# Patient Record
Sex: MALE | Race: AMERICAN INDIAN OR ALASKA NATIVE | NOT HISPANIC OR LATINO
[De-identification: names, ages, dates, MRNs, and addresses within clinical notes are randomized per-mention and may not be internally consistent; named-entity substitution may affect disease eponyms.]

---

## 2017-01-06 ENCOUNTER — APPOINTMENT (OUTPATIENT)
Dept: INTERNAL MEDICINE | Facility: CLINIC | Age: 47
End: 2017-01-06

## 2017-01-06 VITALS
TEMPERATURE: 98.5 F | HEIGHT: 71 IN | BODY MASS INDEX: 33.18 KG/M2 | WEIGHT: 237 LBS | DIASTOLIC BLOOD PRESSURE: 88 MMHG | SYSTOLIC BLOOD PRESSURE: 120 MMHG | OXYGEN SATURATION: 97 % | HEART RATE: 99 BPM

## 2017-01-06 DIAGNOSIS — B97.89 ACUTE UPPER RESPIRATORY INFECTION, UNSPECIFIED: ICD-10-CM

## 2017-01-06 DIAGNOSIS — J06.9 ACUTE UPPER RESPIRATORY INFECTION, UNSPECIFIED: ICD-10-CM

## 2017-01-06 DIAGNOSIS — R14.3 FLATULENCE: ICD-10-CM

## 2017-01-09 LAB
CHOLEST SERPL-MCNC: 155 MG/DL
CHOLEST/HDLC SERPL: 4.2 RATIO
HDLC SERPL-MCNC: 37 MG/DL
LDLC SERPL CALC-MCNC: 77 MG/DL
TRIGL SERPL-MCNC: 204 MG/DL

## 2017-02-06 ENCOUNTER — APPOINTMENT (OUTPATIENT)
Dept: DERMATOLOGY | Facility: CLINIC | Age: 47
End: 2017-02-06

## 2017-02-06 VITALS — DIASTOLIC BLOOD PRESSURE: 80 MMHG | SYSTOLIC BLOOD PRESSURE: 140 MMHG

## 2017-02-06 DIAGNOSIS — L83 ACANTHOSIS NIGRICANS: ICD-10-CM

## 2017-02-06 RX ORDER — AMMONIUM LACTATE 12 %
12 CREAM (GRAM) TOPICAL TWICE DAILY
Qty: 1 | Refills: 3 | Status: ACTIVE | COMMUNITY
Start: 2017-02-06 | End: 1900-01-01

## 2017-04-07 ENCOUNTER — APPOINTMENT (OUTPATIENT)
Dept: INTERNAL MEDICINE | Facility: CLINIC | Age: 47
End: 2017-04-07

## 2017-04-07 VITALS
SYSTOLIC BLOOD PRESSURE: 110 MMHG | HEIGHT: 71 IN | DIASTOLIC BLOOD PRESSURE: 70 MMHG | HEART RATE: 92 BPM | BODY MASS INDEX: 33.18 KG/M2 | WEIGHT: 237 LBS | TEMPERATURE: 97.9 F

## 2017-04-10 LAB
ALBUMIN SERPL ELPH-MCNC: 4.2 G/DL
ALP BLD-CCNC: 82 U/L
ALT SERPL-CCNC: 32 U/L
ANION GAP SERPL CALC-SCNC: 13 MMOL/L
AST SERPL-CCNC: 24 U/L
BILIRUB SERPL-MCNC: 0.2 MG/DL
BUN SERPL-MCNC: 10 MG/DL
CALCIUM SERPL-MCNC: 10.2 MG/DL
CHLORIDE SERPL-SCNC: 105 MMOL/L
CO2 SERPL-SCNC: 24 MMOL/L
CREAT SERPL-MCNC: 0.99 MG/DL
GLUCOSE SERPL-MCNC: 96 MG/DL
HBA1C MFR BLD HPLC: 6.5 %
POTASSIUM SERPL-SCNC: 4.6 MMOL/L
PROT SERPL-MCNC: 7 G/DL
SODIUM SERPL-SCNC: 142 MMOL/L

## 2017-05-10 ENCOUNTER — APPOINTMENT (OUTPATIENT)
Dept: OTOLARYNGOLOGY | Facility: CLINIC | Age: 47
End: 2017-05-10

## 2017-05-10 VITALS
HEIGHT: 71 IN | DIASTOLIC BLOOD PRESSURE: 78 MMHG | SYSTOLIC BLOOD PRESSURE: 129 MMHG | BODY MASS INDEX: 29.96 KG/M2 | WEIGHT: 214 LBS | HEART RATE: 77 BPM

## 2017-05-10 DIAGNOSIS — Z80.9 FAMILY HISTORY OF MALIGNANT NEOPLASM, UNSPECIFIED: ICD-10-CM

## 2017-05-10 DIAGNOSIS — Z86.39 PERSONAL HISTORY OF OTHER ENDOCRINE, NUTRITIONAL AND METABOLIC DISEASE: ICD-10-CM

## 2017-05-23 ENCOUNTER — LABORATORY RESULT (OUTPATIENT)
Age: 47
End: 2017-05-23

## 2017-05-23 ENCOUNTER — APPOINTMENT (OUTPATIENT)
Dept: INTERNAL MEDICINE | Facility: CLINIC | Age: 47
End: 2017-05-23

## 2017-05-23 VITALS
BODY MASS INDEX: 33.6 KG/M2 | HEIGHT: 71 IN | OXYGEN SATURATION: 98 % | SYSTOLIC BLOOD PRESSURE: 130 MMHG | HEART RATE: 68 BPM | WEIGHT: 240 LBS | DIASTOLIC BLOOD PRESSURE: 80 MMHG | TEMPERATURE: 98 F

## 2017-05-24 LAB
ALBUMIN SERPL ELPH-MCNC: 4.3 G/DL
ALP BLD-CCNC: 91 U/L
ALT SERPL-CCNC: 23 U/L
ANION GAP SERPL CALC-SCNC: 13 MMOL/L
AST SERPL-CCNC: 19 U/L
B BURGDOR IGG+IGM SER QL IB: NORMAL
BASOPHILS # BLD AUTO: 0.02 K/UL
BASOPHILS NFR BLD AUTO: 0.3 %
BILIRUB SERPL-MCNC: 0.3 MG/DL
BUN SERPL-MCNC: 9 MG/DL
CALCIUM SERPL-MCNC: 9.4 MG/DL
CHLORIDE SERPL-SCNC: 102 MMOL/L
CO2 SERPL-SCNC: 23 MMOL/L
CREAT SERPL-MCNC: 1 MG/DL
CRP SERPL-MCNC: 0.7 MG/DL
EOSINOPHIL # BLD AUTO: 0.14 K/UL
EOSINOPHIL NFR BLD AUTO: 2.2 %
ERYTHROCYTE [SEDIMENTATION RATE] IN BLOOD BY WESTERGREN METHOD: 30 MM/HR
GLUCOSE SERPL-MCNC: 125 MG/DL
HCT VFR BLD CALC: 42.6 %
HGB BLD-MCNC: 13.5 G/DL
IMM GRANULOCYTES NFR BLD AUTO: 0.2 %
LYMPHOCYTES # BLD AUTO: 2.74 K/UL
LYMPHOCYTES NFR BLD AUTO: 44 %
MAN DIFF?: NORMAL
MCHC RBC-ENTMCNC: 28.1 PG
MCHC RBC-ENTMCNC: 31.7 GM/DL
MCV RBC AUTO: 88.8 FL
MONOCYTES # BLD AUTO: 0.53 K/UL
MONOCYTES NFR BLD AUTO: 8.5 %
NEUTROPHILS # BLD AUTO: 2.79 K/UL
NEUTROPHILS NFR BLD AUTO: 44.8 %
PLATELET # BLD AUTO: 330 K/UL
POTASSIUM SERPL-SCNC: 5.1 MMOL/L
PROT SERPL-MCNC: 7 G/DL
RBC # BLD: 4.8 M/UL
RBC # FLD: 13 %
SODIUM SERPL-SCNC: 138 MMOL/L
TSH SERPL-ACNC: 3.14 UIU/ML
URATE SERPL-MCNC: 6.9 MG/DL
VIT B12 SERPL-MCNC: 263 PG/ML
WBC # FLD AUTO: 6.23 K/UL

## 2017-06-04 ENCOUNTER — FORM ENCOUNTER (OUTPATIENT)
Age: 47
End: 2017-06-04

## 2017-06-05 ENCOUNTER — APPOINTMENT (OUTPATIENT)
Dept: MRI IMAGING | Facility: IMAGING CENTER | Age: 47
End: 2017-06-05

## 2017-06-05 ENCOUNTER — APPOINTMENT (OUTPATIENT)
Dept: CT IMAGING | Facility: IMAGING CENTER | Age: 47
End: 2017-06-05

## 2017-06-05 ENCOUNTER — OUTPATIENT (OUTPATIENT)
Dept: OUTPATIENT SERVICES | Facility: HOSPITAL | Age: 47
LOS: 1 days | End: 2017-06-05
Payer: MEDICAID

## 2017-06-05 DIAGNOSIS — H90.5 UNSPECIFIED SENSORINEURAL HEARING LOSS: ICD-10-CM

## 2017-06-05 DIAGNOSIS — Z98.89 OTHER SPECIFIED POSTPROCEDURAL STATES: Chronic | ICD-10-CM

## 2017-06-05 DIAGNOSIS — R22.0 LOCALIZED SWELLING, MASS AND LUMP, HEAD: ICD-10-CM

## 2017-06-05 PROCEDURE — A9585: CPT

## 2017-06-05 PROCEDURE — 70553 MRI BRAIN STEM W/O & W/DYE: CPT

## 2017-06-05 PROCEDURE — 70491 CT SOFT TISSUE NECK W/DYE: CPT

## 2017-07-03 ENCOUNTER — APPOINTMENT (OUTPATIENT)
Dept: OTOLARYNGOLOGY | Facility: CLINIC | Age: 47
End: 2017-07-03

## 2017-07-10 ENCOUNTER — NON-APPOINTMENT (OUTPATIENT)
Age: 47
End: 2017-07-10

## 2017-07-10 ENCOUNTER — APPOINTMENT (OUTPATIENT)
Dept: INTERNAL MEDICINE | Facility: CLINIC | Age: 47
End: 2017-07-10

## 2017-07-10 VITALS
WEIGHT: 237 LBS | BODY MASS INDEX: 33.18 KG/M2 | HEART RATE: 87 BPM | OXYGEN SATURATION: 98 % | SYSTOLIC BLOOD PRESSURE: 118 MMHG | HEIGHT: 71 IN | TEMPERATURE: 98 F | DIASTOLIC BLOOD PRESSURE: 70 MMHG

## 2017-07-10 DIAGNOSIS — M25.50 PAIN IN UNSPECIFIED JOINT: ICD-10-CM

## 2017-07-10 LAB — HBA1C MFR BLD HPLC: 6

## 2017-10-14 ENCOUNTER — TRANSCRIPTION ENCOUNTER (OUTPATIENT)
Age: 47
End: 2017-10-14

## 2017-10-16 ENCOUNTER — LABORATORY RESULT (OUTPATIENT)
Age: 47
End: 2017-10-16

## 2017-10-16 ENCOUNTER — APPOINTMENT (OUTPATIENT)
Dept: INTERNAL MEDICINE | Facility: CLINIC | Age: 47
End: 2017-10-16
Payer: MEDICAID

## 2017-10-16 VITALS
HEIGHT: 71 IN | SYSTOLIC BLOOD PRESSURE: 128 MMHG | DIASTOLIC BLOOD PRESSURE: 76 MMHG | HEART RATE: 95 BPM | TEMPERATURE: 98.5 F | BODY MASS INDEX: 33.46 KG/M2 | OXYGEN SATURATION: 98 % | WEIGHT: 239 LBS

## 2017-10-16 DIAGNOSIS — Z87.898 PERSONAL HISTORY OF OTHER SPECIFIED CONDITIONS: ICD-10-CM

## 2017-10-16 DIAGNOSIS — M70.20 OLECRANON BURSITIS, UNSPECIFIED ELBOW: ICD-10-CM

## 2017-10-16 LAB — HBA1C MFR BLD HPLC: 6.8 %

## 2017-10-16 PROCEDURE — 36415 COLL VENOUS BLD VENIPUNCTURE: CPT

## 2017-10-16 PROCEDURE — 99214 OFFICE O/P EST MOD 30 MIN: CPT | Mod: 25

## 2017-10-16 PROCEDURE — G0008: CPT

## 2017-10-16 PROCEDURE — 90688 IIV4 VACCINE SPLT 0.5 ML IM: CPT

## 2017-10-19 LAB
CHOLEST SERPL-MCNC: 153 MG/DL
CHOLEST/HDLC SERPL: 5.1 RATIO
HDLC SERPL-MCNC: 30 MG/DL
LDLC SERPL CALC-MCNC: 61 MG/DL
TRIGL SERPL-MCNC: 310 MG/DL
TSH SERPL-ACNC: 5.05 UIU/ML
VIT B12 SERPL-MCNC: 269 PG/ML

## 2017-10-20 LAB — METHYLMALONATE SERPL-SCNC: 341 NMOL/L

## 2018-01-19 ENCOUNTER — MOBILE ON CALL (OUTPATIENT)
Age: 48
End: 2018-01-19

## 2018-01-22 ENCOUNTER — APPOINTMENT (OUTPATIENT)
Dept: INTERNAL MEDICINE | Facility: CLINIC | Age: 48
End: 2018-01-22
Payer: MEDICAID

## 2018-01-22 VITALS
HEART RATE: 80 BPM | WEIGHT: 239 LBS | DIASTOLIC BLOOD PRESSURE: 70 MMHG | BODY MASS INDEX: 33.46 KG/M2 | SYSTOLIC BLOOD PRESSURE: 110 MMHG | HEIGHT: 71 IN | TEMPERATURE: 98.6 F | OXYGEN SATURATION: 98 %

## 2018-01-22 DIAGNOSIS — R60.9 EDEMA, UNSPECIFIED: ICD-10-CM

## 2018-01-22 PROCEDURE — 36415 COLL VENOUS BLD VENIPUNCTURE: CPT

## 2018-01-22 PROCEDURE — 99214 OFFICE O/P EST MOD 30 MIN: CPT | Mod: 25

## 2018-01-23 LAB
25(OH)D3 SERPL-MCNC: 21.6 NG/ML
ALBUMIN SERPL ELPH-MCNC: 4.4 G/DL
ALP BLD-CCNC: 107 U/L
ALT SERPL-CCNC: 38 U/L
ANION GAP SERPL CALC-SCNC: 17 MMOL/L
AST SERPL-CCNC: 22 U/L
BILIRUB SERPL-MCNC: 0.2 MG/DL
BUN SERPL-MCNC: 10 MG/DL
CALCIUM SERPL-MCNC: 10.2 MG/DL
CHLORIDE SERPL-SCNC: 98 MMOL/L
CHOLEST SERPL-MCNC: 166 MG/DL
CHOLEST/HDLC SERPL: 5.2 RATIO
CO2 SERPL-SCNC: 23 MMOL/L
CREAT SERPL-MCNC: 1.1 MG/DL
CREAT SPEC-SCNC: 21 MG/DL
GLUCOSE SERPL-MCNC: 192 MG/DL
HBA1C MFR BLD HPLC: 7.3 %
HDLC SERPL-MCNC: 32 MG/DL
LDLC SERPL CALC-MCNC: 82 MG/DL
MICROALBUMIN 24H UR DL<=1MG/L-MCNC: 0.6 MG/DL
MICROALBUMIN/CREAT 24H UR-RTO: 29 MG/G
POTASSIUM SERPL-SCNC: 4.7 MMOL/L
PROT SERPL-MCNC: 7.4 G/DL
SODIUM SERPL-SCNC: 138 MMOL/L
TRIGL SERPL-MCNC: 260 MG/DL
TSH SERPL-ACNC: 2.64 UIU/ML
VIT B12 SERPL-MCNC: 481 PG/ML

## 2018-01-24 ENCOUNTER — RX RENEWAL (OUTPATIENT)
Age: 48
End: 2018-01-24

## 2018-02-15 ENCOUNTER — APPOINTMENT (OUTPATIENT)
Dept: INTERNAL MEDICINE | Facility: CLINIC | Age: 48
End: 2018-02-15
Payer: MEDICAID

## 2018-02-15 VITALS
HEART RATE: 84 BPM | OXYGEN SATURATION: 98 % | RESPIRATION RATE: 16 BRPM | TEMPERATURE: 99.7 F | HEIGHT: 71 IN | BODY MASS INDEX: 33.18 KG/M2 | DIASTOLIC BLOOD PRESSURE: 72 MMHG | SYSTOLIC BLOOD PRESSURE: 134 MMHG | WEIGHT: 237 LBS

## 2018-02-15 LAB
FLUAV SPEC QL CULT: POSITIVE
FLUBV AG SPEC QL IA: NEGATIVE

## 2018-02-15 PROCEDURE — 99214 OFFICE O/P EST MOD 30 MIN: CPT | Mod: 25

## 2018-02-15 PROCEDURE — 87804 INFLUENZA ASSAY W/OPTIC: CPT | Mod: QW

## 2018-04-09 ENCOUNTER — APPOINTMENT (OUTPATIENT)
Dept: INTERNAL MEDICINE | Facility: CLINIC | Age: 48
End: 2018-04-09
Payer: MEDICAID

## 2018-04-09 VITALS
WEIGHT: 238 LBS | DIASTOLIC BLOOD PRESSURE: 90 MMHG | TEMPERATURE: 98 F | SYSTOLIC BLOOD PRESSURE: 140 MMHG | HEART RATE: 84 BPM | BODY MASS INDEX: 33.32 KG/M2 | HEIGHT: 71 IN | OXYGEN SATURATION: 98 %

## 2018-04-09 DIAGNOSIS — M77.00 MEDIAL EPICONDYLITIS, UNSPECIFIED ELBOW: ICD-10-CM

## 2018-04-09 DIAGNOSIS — Z87.898 PERSONAL HISTORY OF OTHER SPECIFIED CONDITIONS: ICD-10-CM

## 2018-04-09 DIAGNOSIS — R68.89 OTHER GENERAL SYMPTOMS AND SIGNS: ICD-10-CM

## 2018-04-09 PROCEDURE — 99214 OFFICE O/P EST MOD 30 MIN: CPT | Mod: 25

## 2018-04-09 PROCEDURE — 83036 HEMOGLOBIN GLYCOSYLATED A1C: CPT | Mod: QW

## 2018-04-09 RX ORDER — OSELTAMIVIR PHOSPHATE 75 MG/1
75 CAPSULE ORAL TWICE DAILY
Qty: 10 | Refills: 0 | Status: DISCONTINUED | COMMUNITY
Start: 2018-02-15 | End: 2018-04-09

## 2018-05-15 ENCOUNTER — APPOINTMENT (OUTPATIENT)
Dept: INTERNAL MEDICINE | Facility: CLINIC | Age: 48
End: 2018-05-15
Payer: MEDICAID

## 2018-05-15 VITALS
DIASTOLIC BLOOD PRESSURE: 80 MMHG | SYSTOLIC BLOOD PRESSURE: 132 MMHG | BODY MASS INDEX: 32.91 KG/M2 | TEMPERATURE: 99.2 F | HEIGHT: 71 IN | OXYGEN SATURATION: 98 % | WEIGHT: 235.08 LBS | HEART RATE: 91 BPM

## 2018-05-15 DIAGNOSIS — Z87.09 PERSONAL HISTORY OF OTHER DISEASES OF THE RESPIRATORY SYSTEM: ICD-10-CM

## 2018-05-15 LAB — S PYO AG SPEC QL IA: NEGATIVE

## 2018-05-15 PROCEDURE — 99213 OFFICE O/P EST LOW 20 MIN: CPT | Mod: 25

## 2018-05-15 PROCEDURE — 87880 STREP A ASSAY W/OPTIC: CPT | Mod: QW

## 2018-05-15 RX ORDER — DICLOFENAC SODIUM 10 MG/G
1 GEL TOPICAL
Qty: 1 | Refills: 3 | Status: DISCONTINUED | COMMUNITY
Start: 2017-10-16 | End: 2018-05-15

## 2018-05-15 NOTE — HISTORY OF PRESENT ILLNESS
[FreeTextEntry8] : 10 days of sore throat and cough.\par Has been taking benzonatate x 3-4 days (left over from previous illness), helps somewhat with the cough.  \par Wakes 2-3 times at night with feeling of mucous stuck in the throat.  Has to get up and clear it.    \par Denies sinus congestion or runny nose.  Taking loratadine every other day, which helps with seasonal allergies (usually manifested by runny nose and sneezing).\par No improvement in the throat pain x 10 days, also no worse.  Hurts to swallow.  No fever, but has body aches, takes Tylenol.  Cough mostly dry.  Denies shortness of breath, pleuritic chest pain, wheeze.  Took Amoxicillin 500mg BID x 3 days (prescribed by relative in [place]) at start of illness, but no improvement.  \par Takes omeprazole occasionally, maybe 1x week.  Not having heartburn.

## 2018-05-15 NOTE — REVIEW OF SYSTEMS
[Sore Throat] : sore throat [Cough] : cough [Negative] : Gastrointestinal [Earache] : no earache [Nasal Discharge] : no nasal discharge [Shortness Of Breath] : no shortness of breath [Wheezing] : no wheezing [FreeTextEntry9] : body aches

## 2018-05-15 NOTE — ASSESSMENT
[FreeTextEntry1] : \par #1 Pharyngitis\par Rapid strep negative\par Symptoms x 10 days without improvement, low grade temp today\par Will treat with cephalexin (already did 3 days of amoxicillin)\par Salt water gargles\par Cough suppressant as needed\par

## 2018-05-15 NOTE — PHYSICAL EXAM
[No Acute Distress] : no acute distress [Well-Appearing] : well-appearing [Normal Outer Ear/Nose] : the outer ears and nose were normal in appearance [Normal TMs] : both tympanic membranes were normal [Supple] : supple [No Lymphadenopathy] : no lymphadenopathy [No Respiratory Distress] : no respiratory distress  [Clear to Auscultation] : lungs were clear to auscultation bilaterally [No Accessory Muscle Use] : no accessory muscle use [Normal Rate] : normal rate  [Regular Rhythm] : with a regular rhythm [Normal S1, S2] : normal S1 and S2 [No Murmur] : no murmur heard [de-identified] : erythema of posterior oropharynx, enlarge tonsils, no exudate

## 2018-05-16 ENCOUNTER — MEDICATION RENEWAL (OUTPATIENT)
Age: 48
End: 2018-05-16

## 2018-05-18 ENCOUNTER — RESULT REVIEW (OUTPATIENT)
Age: 48
End: 2018-05-18

## 2018-05-18 LAB — BACTERIA THROAT CULT: NORMAL

## 2018-05-20 ENCOUNTER — RX RENEWAL (OUTPATIENT)
Age: 48
End: 2018-05-20

## 2018-07-01 ENCOUNTER — OUTPATIENT (OUTPATIENT)
Dept: OUTPATIENT SERVICES | Facility: HOSPITAL | Age: 48
LOS: 1 days | End: 2018-07-01
Payer: MEDICAID

## 2018-07-01 DIAGNOSIS — Z98.89 OTHER SPECIFIED POSTPROCEDURAL STATES: Chronic | ICD-10-CM

## 2018-07-01 PROCEDURE — G9001: CPT

## 2018-07-16 ENCOUNTER — APPOINTMENT (OUTPATIENT)
Dept: INTERNAL MEDICINE | Facility: CLINIC | Age: 48
End: 2018-07-16
Payer: MEDICAID

## 2018-07-16 VITALS
BODY MASS INDEX: 32.62 KG/M2 | SYSTOLIC BLOOD PRESSURE: 110 MMHG | DIASTOLIC BLOOD PRESSURE: 80 MMHG | HEART RATE: 78 BPM | WEIGHT: 233 LBS | HEIGHT: 71 IN | OXYGEN SATURATION: 98 %

## 2018-07-16 DIAGNOSIS — E03.9 HYPOTHYROIDISM, UNSPECIFIED: ICD-10-CM

## 2018-07-16 DIAGNOSIS — M25.511 PAIN IN RIGHT SHOULDER: ICD-10-CM

## 2018-07-16 LAB — HBA1C MFR BLD HPLC: 6.6

## 2018-07-16 PROCEDURE — 36415 COLL VENOUS BLD VENIPUNCTURE: CPT

## 2018-07-16 PROCEDURE — 83036 HEMOGLOBIN GLYCOSYLATED A1C: CPT | Mod: QW

## 2018-07-16 PROCEDURE — 99396 PREV VISIT EST AGE 40-64: CPT | Mod: 25

## 2018-07-16 RX ORDER — CEPHALEXIN 500 MG/1
500 TABLET ORAL
Qty: 20 | Refills: 0 | Status: DISCONTINUED | COMMUNITY
Start: 2018-05-15 | End: 2018-07-16

## 2018-07-16 RX ORDER — OMEPRAZOLE 20 MG/1
20 CAPSULE, DELAYED RELEASE ORAL
Qty: 30 | Refills: 3 | Status: DISCONTINUED | COMMUNITY
Start: 2018-04-09 | End: 2018-07-16

## 2018-07-16 NOTE — REVIEW OF SYSTEMS
[Fever] : no fever [Night Sweats] : no night sweats [Vision Problems] : no vision problems [Nasal Discharge] : no nasal discharge [Chest Pain] : no chest pain [Lower Ext Edema] : no lower extremity edema [Shortness Of Breath] : no shortness of breath [Dyspnea on Exertion] : not dyspnea on exertion [Abdominal Pain] : no abdominal pain [Hesitancy] : no hesitancy [Joint Pain] : no joint pain [Skin Rash] : no skin rash [Headache] : no headache [Depression] : no depression

## 2018-07-16 NOTE — HISTORY OF PRESENT ILLNESS
[FreeTextEntry1] : cpe [de-identified] : 1. DM Taking dm meds as directed. checking sugars occ run 110s.  taking statin. utd ophtho exam. eating well. \par 2. HTN does not check at home \par 3. Elbow and shoulder improved w b12\par 4. HM utd colo. walking 3 times per week 3 mil per time no cp, sob. no edema. \par 5. occ tinnitus no ear pain due for ear exam

## 2018-07-16 NOTE — PHYSICAL EXAM
[No Acute Distress] : no acute distress [Well Nourished] : well nourished [Well Developed] : well developed [Well-Appearing] : well-appearing [Normal Sclera/Conjunctiva] : normal sclera/conjunctiva [PERRL] : pupils equal round and reactive to light [EOMI] : extraocular movements intact [Normal Outer Ear/Nose] : the outer ears and nose were normal in appearance [Normal Oropharynx] : the oropharynx was normal [Normal TMs] : both tympanic membranes were normal [Normal Nasal Mucosa] : the nasal mucosa was normal [No JVD] : no jugular venous distention [Supple] : supple [No Lymphadenopathy] : no lymphadenopathy [Thyroid Normal, No Nodules] : the thyroid was normal and there were no nodules present [No Respiratory Distress] : no respiratory distress  [Clear to Auscultation] : lungs were clear to auscultation bilaterally [No Accessory Muscle Use] : no accessory muscle use [Normal Rate] : normal rate  [Regular Rhythm] : with a regular rhythm [Normal S1, S2] : normal S1 and S2 [No Murmur] : no murmur heard [No Carotid Bruits] : no carotid bruits [No Abdominal Bruit] : a ~M bruit was not heard ~T in the abdomen [No Varicosities] : no varicosities [Pedal Pulses Present] : the pedal pulses are present [No Edema] : there was no peripheral edema [No Extremity Clubbing/Cyanosis] : no extremity clubbing/cyanosis [No Palpable Aorta] : no palpable aorta [Soft] : abdomen soft [Non Tender] : non-tender [Non-distended] : non-distended [No Masses] : no abdominal mass palpated [No HSM] : no HSM [Normal Bowel Sounds] : normal bowel sounds [Normal Supraclavicular Nodes] : no supraclavicular lymphadenopathy [Normal Posterior Cervical Nodes] : no posterior cervical lymphadenopathy [Normal Anterior Cervical Nodes] : no anterior cervical lymphadenopathy [No CVA Tenderness] : no CVA  tenderness [No Spinal Tenderness] : no spinal tenderness [No Joint Swelling] : no joint swelling [Grossly Normal Strength/Tone] : grossly normal strength/tone [No Rash] : no rash [Normal Gait] : normal gait [Coordination Grossly Intact] : coordination grossly intact [No Focal Deficits] : no focal deficits [Deep Tendon Reflexes (DTR)] : deep tendon reflexes were 2+ and symmetric [Normal Affect] : the affect was normal [Normal Mood] : the mood was normal [Normal Insight/Judgement] : insight and judgment were intact [Comprehensive Foot Exam Normal] : Right and left foot were examined and both feet are normal. No ulcers in either foot. Toes are normal and with full ROM.  Normal tactile sensation with monofilament testing throughout both feet

## 2018-07-16 NOTE — ASSESSMENT
[FreeTextEntry1] : 1. DM A1C at goal. cont statin. utd eye , foot exam. utd microalb. discussed diet and exercise. \par 2. HTN at goal cont lis. check labs. \par 3. Elbow and shoulder improved\par 4. HM utd colo. cont walking. due for shingrix next year. \par 5. [facility] fu audiology for annual exam\par fu 3m for dm

## 2018-07-17 LAB
ALBUMIN SERPL ELPH-MCNC: 4.4 G/DL
ALP BLD-CCNC: 107 U/L
ALT SERPL-CCNC: 21 U/L
ANION GAP SERPL CALC-SCNC: 16 MMOL/L
AST SERPL-CCNC: 17 U/L
BASOPHILS # BLD AUTO: 0.02 K/UL
BASOPHILS NFR BLD AUTO: 0.2 %
BILIRUB SERPL-MCNC: 0.3 MG/DL
BUN SERPL-MCNC: 10 MG/DL
CALCIUM SERPL-MCNC: 9.8 MG/DL
CHLORIDE SERPL-SCNC: 105 MMOL/L
CHOLEST SERPL-MCNC: 119 MG/DL
CHOLEST/HDLC SERPL: 3.3 RATIO
CO2 SERPL-SCNC: 22 MMOL/L
CREAT SERPL-MCNC: 0.99 MG/DL
EOSINOPHIL # BLD AUTO: 0.14 K/UL
EOSINOPHIL NFR BLD AUTO: 1.6 %
GLUCOSE SERPL-MCNC: 139 MG/DL
HCT VFR BLD CALC: 44.7 %
HDLC SERPL-MCNC: 36 MG/DL
HGB BLD-MCNC: 14.2 G/DL
IMM GRANULOCYTES NFR BLD AUTO: 0.1 %
LDLC SERPL CALC-MCNC: 54 MG/DL
LYMPHOCYTES # BLD AUTO: 3.35 K/UL
LYMPHOCYTES NFR BLD AUTO: 37.4 %
MAN DIFF?: NORMAL
MCHC RBC-ENTMCNC: 29.1 PG
MCHC RBC-ENTMCNC: 31.8 GM/DL
MCV RBC AUTO: 91.6 FL
MONOCYTES # BLD AUTO: 0.69 K/UL
MONOCYTES NFR BLD AUTO: 7.7 %
NEUTROPHILS # BLD AUTO: 4.74 K/UL
NEUTROPHILS NFR BLD AUTO: 53 %
PLATELET # BLD AUTO: 336 K/UL
POTASSIUM SERPL-SCNC: 4.9 MMOL/L
PROT SERPL-MCNC: 7 G/DL
RBC # BLD: 4.88 M/UL
RBC # FLD: 13.6 %
SODIUM SERPL-SCNC: 143 MMOL/L
TRIGL SERPL-MCNC: 145 MG/DL
TSH SERPL-ACNC: 3.97 UIU/ML
VIT B12 SERPL-MCNC: 682 PG/ML
WBC # FLD AUTO: 8.95 K/UL

## 2018-07-24 DIAGNOSIS — Z71.89 OTHER SPECIFIED COUNSELING: ICD-10-CM

## 2018-08-02 ENCOUNTER — APPOINTMENT (OUTPATIENT)
Dept: INTERNAL MEDICINE | Facility: CLINIC | Age: 48
End: 2018-08-02
Payer: MEDICAID

## 2018-08-02 VITALS
DIASTOLIC BLOOD PRESSURE: 72 MMHG | SYSTOLIC BLOOD PRESSURE: 112 MMHG | BODY MASS INDEX: 32.62 KG/M2 | WEIGHT: 233 LBS | OXYGEN SATURATION: 97 % | RESPIRATION RATE: 16 BRPM | HEIGHT: 71 IN | HEART RATE: 92 BPM | TEMPERATURE: 98.6 F

## 2018-08-02 PROCEDURE — 99214 OFFICE O/P EST MOD 30 MIN: CPT

## 2018-08-02 RX ORDER — BLOOD-GLUCOSE METER
EACH MISCELLANEOUS
Qty: 10 | Refills: 1 | Status: ACTIVE | COMMUNITY
Start: 2018-08-02 | End: 1900-01-01

## 2018-08-03 NOTE — REVIEW OF SYSTEMS
[Negative] : Neurological [Fever] : no fever [Chills] : no chills [Unsteady Walk] : no ataxia [FreeTextEntry9] : see HPI

## 2018-08-03 NOTE — PHYSICAL EXAM
[No Acute Distress] : no acute distress [Well-Appearing] : well-appearing [Normal Sclera/Conjunctiva] : normal sclera/conjunctiva [PERRL] : pupils equal round and reactive to light [EOMI] : extraocular movements intact [Normal Outer Ear/Nose] : the outer ears and nose were normal in appearance [Normal Nasal Mucosa] : the nasal mucosa was normal [Supple] : supple [No Lymphadenopathy] : no lymphadenopathy [No Respiratory Distress] : no respiratory distress  [Clear to Auscultation] : lungs were clear to auscultation bilaterally [Normal Rate] : normal rate  [Regular Rhythm] : with a regular rhythm [Normal S1, S2] : normal S1 and S2 [No Murmur] : no murmur heard [No Edema] : there was no peripheral edema [Soft] : abdomen soft [Non Tender] : non-tender [No HSM] : no HSM [No CVA Tenderness] : no CVA  tenderness [No Spinal Tenderness] : no spinal tenderness [No Joint Swelling] : no joint swelling [Grossly Normal Strength/Tone] : grossly normal strength/tone [No Rash] : no rash [Normal Gait] : normal gait [No Focal Deficits] : no focal deficits [Normal Affect] : the affect was normal [Alert and Oriented x3] : oriented to person, place, and time [de-identified] : mild paraspinal tenderness along right mid back to lumbar area, no rash or lesions; nl right buttock area w/o pain; negative SLR, nl spinal curvature and flexibility on exam

## 2018-08-03 NOTE — ASSESSMENT
[FreeTextEntry1] : 46 yo M pmhx DM (7/18 A1c 6.6), HTN, HLD here for acute visit\par \par back pain- c/w muscular pain likely 2/2 heavy lifting at work; neuro exam wnl\par -cont. naproxen with food prn, advised to avoid all other NSAIDs \par -advised to avoid heavy lifting, stretching, heat prn; Rx for heating pads escribed per request (aware may not be covered by insurance)\par -[facility] handout on LBP given to pt and reviewed wth pt\par -advised to f/u if sx's worsen \par -advised prompt ER eval if fever, incontinence or trouble with ambulation\par \par HTN- BP wnl\par -on lisinopril\par -7/18 cmp wnl\par \par Pt has prior scheduled office f/u with PMD Dr. Vogel 10/15/18, declines f/u sooner.  Wishes to call back as needed, encouraged.

## 2018-08-03 NOTE — HISTORY OF PRESENT ILLNESS
[FreeTextEntry8] : \par Accompanied by wife.\par \par 46 yo M pmhx DM, HTN, HLD here for acute visit\par \par c/o right sided back pain x 10 days, sharp, on/off.  Felt on awakening, gets less with walking, as does for 30 mins daily.  Pain recurs when sits.  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

## 2018-09-08 ENCOUNTER — RX RENEWAL (OUTPATIENT)
Age: 48
End: 2018-09-08

## 2018-09-14 ENCOUNTER — RX RENEWAL (OUTPATIENT)
Age: 48
End: 2018-09-14

## 2018-10-15 ENCOUNTER — APPOINTMENT (OUTPATIENT)
Dept: INTERNAL MEDICINE | Facility: CLINIC | Age: 48
End: 2018-10-15
Payer: MEDICAID

## 2018-10-15 VITALS
HEIGHT: 71 IN | DIASTOLIC BLOOD PRESSURE: 76 MMHG | BODY MASS INDEX: 32.62 KG/M2 | WEIGHT: 233 LBS | SYSTOLIC BLOOD PRESSURE: 120 MMHG | TEMPERATURE: 98.5 F | HEART RATE: 70 BPM | OXYGEN SATURATION: 98 %

## 2018-10-15 PROCEDURE — 99214 OFFICE O/P EST MOD 30 MIN: CPT | Mod: 25

## 2018-10-15 PROCEDURE — G0008: CPT

## 2018-10-15 PROCEDURE — 36415 COLL VENOUS BLD VENIPUNCTURE: CPT

## 2018-10-15 PROCEDURE — 90686 IIV4 VACC NO PRSV 0.5 ML IM: CPT

## 2018-10-15 NOTE — HISTORY OF PRESENT ILLNESS
[FreeTextEntry1] : fu dm [de-identified] : 1. DM Taking dm meds as directed. checking sugars occ run 110-130s pp and fasting. taking statin. utd ophtho exam. eating well. \par 2. HTN does not check at home taking meds as directed\par 3. Elbow and shoulder improved \par 4. HM utd colo. walking 3 times per week 3 mil per time no cp, sob. no edema. \par 5. going to [place] in 2m for 2m staying with family in [facility] req malaria, tyhpoid ppx

## 2018-10-15 NOTE — ASSESSMENT
[FreeTextEntry1] : 1. DM A1C at goal. cont statin. utd eye , foot exam. utd microalb. discussed diet and exercise. check a1c.\par 2. HTN at goal cont meds check bloodwork\par 3. Elbow and shoulder improved \par 4. HM utd colo. cont exercise. flu shot.\par 5. travel counseling - check hep a, b titers. pt req weekly not dialy med will send mefloquine. tyhpoid vaccine. instructed pt on mosquito bite prevention and potential med side effects he wants to proceed. ekg utd.

## 2018-10-15 NOTE — PHYSICAL EXAM
[No Acute Distress] : no acute distress [Well Nourished] : well nourished [Normal Sclera/Conjunctiva] : normal sclera/conjunctiva [Normal Outer Ear/Nose] : the outer ears and nose were normal in appearance [No JVD] : no jugular venous distention [Supple] : supple [Clear to Auscultation] : lungs were clear to auscultation bilaterally [Regular Rhythm] : with a regular rhythm [Normal S1, S2] : normal S1 and S2 [No Edema] : there was no peripheral edema [Soft] : abdomen soft [Non Tender] : non-tender [No HSM] : no HSM [Normal Posterior Cervical Nodes] : no posterior cervical lymphadenopathy [Normal Anterior Cervical Nodes] : no anterior cervical lymphadenopathy [No Joint Swelling] : no joint swelling [No Rash] : no rash [Normal Gait] : normal gait [Normal Insight/Judgement] : insight and judgment were intact

## 2018-10-15 NOTE — REVIEW OF SYSTEMS
[Fever] : no fever [Night Sweats] : no night sweats [Vision Problems] : no vision problems [Earache] : no earache [Chest Pain] : no chest pain [Shortness Of Breath] : no shortness of breath [Abdominal Pain] : no abdominal pain [Dysuria] : no dysuria [Joint Pain] : no joint pain [Back Pain] : no back pain [Skin Rash] : no skin rash [Headache] : no headache [Depression] : no depression

## 2018-10-16 LAB
ANION GAP SERPL CALC-SCNC: 14 MMOL/L
BUN SERPL-MCNC: 10 MG/DL
CALCIUM SERPL-MCNC: 9.9 MG/DL
CHLORIDE SERPL-SCNC: 103 MMOL/L
CO2 SERPL-SCNC: 23 MMOL/L
CREAT SERPL-MCNC: 0.9 MG/DL
GLUCOSE SERPL-MCNC: 115 MG/DL
HBA1C MFR BLD HPLC: 7 %
HBV SURFACE AB SER QL: NONREACTIVE
HBV SURFACE AG SER QL: NONREACTIVE
HEPATITIS A IGG ANTIBODY: REACTIVE
POTASSIUM SERPL-SCNC: 4.9 MMOL/L
SODIUM SERPL-SCNC: 140 MMOL/L

## 2018-10-22 ENCOUNTER — MED ADMIN CHARGE (OUTPATIENT)
Age: 48
End: 2018-10-22

## 2018-10-22 ENCOUNTER — APPOINTMENT (OUTPATIENT)
Dept: INTERNAL MEDICINE | Facility: CLINIC | Age: 48
End: 2018-10-22
Payer: MEDICAID

## 2018-10-22 PROCEDURE — 90471 IMMUNIZATION ADMIN: CPT

## 2018-10-22 PROCEDURE — 90746 HEPB VACCINE 3 DOSE ADULT IM: CPT

## 2019-02-11 ENCOUNTER — APPOINTMENT (OUTPATIENT)
Dept: INTERNAL MEDICINE | Facility: CLINIC | Age: 49
End: 2019-02-11
Payer: MEDICAID

## 2019-02-11 VITALS
HEART RATE: 80 BPM | WEIGHT: 231 LBS | SYSTOLIC BLOOD PRESSURE: 120 MMHG | HEIGHT: 71 IN | BODY MASS INDEX: 32.34 KG/M2 | DIASTOLIC BLOOD PRESSURE: 60 MMHG | OXYGEN SATURATION: 98 %

## 2019-02-11 DIAGNOSIS — Z87.39 PERSONAL HISTORY OF OTHER DISEASES OF THE MUSCULOSKELETAL SYSTEM AND CONNECTIVE TISSUE: ICD-10-CM

## 2019-02-11 LAB — HBA1C MFR BLD HPLC: 6.9

## 2019-02-11 PROCEDURE — 90746 HEPB VACCINE 3 DOSE ADULT IM: CPT

## 2019-02-11 PROCEDURE — 90471 IMMUNIZATION ADMIN: CPT

## 2019-02-11 PROCEDURE — 99214 OFFICE O/P EST MOD 30 MIN: CPT | Mod: 25

## 2019-02-11 PROCEDURE — 83036 HEMOGLOBIN GLYCOSYLATED A1C: CPT | Mod: QW

## 2019-02-11 RX ORDER — SALMONELLA TYPHI TY21A 2 [CFU]/[CFU]
CAPSULE, COATED ORAL
Qty: 4 | Refills: 0 | Status: DISCONTINUED | COMMUNITY
Start: 2018-10-15 | End: 2019-02-11

## 2019-02-12 PROBLEM — Z87.39 HISTORY OF BACK PAIN: Status: RESOLVED | Noted: 2018-08-02 | Resolved: 2019-02-12

## 2019-02-12 NOTE — HISTORY OF PRESENT ILLNESS
[FreeTextEntry1] : dm fu [de-identified] : 1. DM Taking dm meds as directed. checking sugars occ run 120-130s pp and fasting. taking statin. utd ophtho exam. eating well. stopped exercising plans to restart \par 2. HTN does not check at home taking meds as directed\par 3. Elbow and shoulder improved \par 4. HM utd colo. due for hep b #2, shingrix.. \par 5. occ tinnitus last audiology exam 2017\par   EMS

## 2019-02-12 NOTE — PHYSICAL EXAM
[No Acute Distress] : no acute distress [Normal Sclera/Conjunctiva] : normal sclera/conjunctiva [Normal Outer Ear/Nose] : the outer ears and nose were normal in appearance [Normal Oropharynx] : the oropharynx was normal [Supple] : supple [Clear to Auscultation] : lungs were clear to auscultation bilaterally [Regular Rhythm] : with a regular rhythm [Normal S1, S2] : normal S1 and S2 [Soft] : abdomen soft [Non Tender] : non-tender [No HSM] : no HSM [Normal Posterior Cervical Nodes] : no posterior cervical lymphadenopathy [Normal Anterior Cervical Nodes] : no anterior cervical lymphadenopathy [No Joint Swelling] : no joint swelling [No Rash] : no rash [Normal Gait] : normal gait [Normal Mood] : the mood was normal [Comprehensive Foot Exam Normal] : Right and left foot were examined and both feet are normal. No ulcers in either foot. Toes are normal and with full ROM.  Normal tactile sensation with monofilament testing throughout both feet

## 2019-02-12 NOTE — ASSESSMENT
[FreeTextEntry1] : 1. DM A1C at goal. cont statin. utd eye , foot exam. utd microalb. discussed diet and exercise. \par 2. HTN at goal cont meds labs next visit\par 3. Elbow and shoulder improved \par 4. HM utd colo. due for hep b #2, shingrix.. \par 5. [facility] due for audiology exam

## 2019-04-12 ENCOUNTER — TRANSCRIPTION ENCOUNTER (OUTPATIENT)
Age: 49
End: 2019-04-12

## 2019-04-18 ENCOUNTER — APPOINTMENT (OUTPATIENT)
Dept: INTERNAL MEDICINE | Facility: CLINIC | Age: 49
End: 2019-04-18
Payer: MEDICAID

## 2019-04-18 VITALS
HEART RATE: 82 BPM | DIASTOLIC BLOOD PRESSURE: 82 MMHG | TEMPERATURE: 98.5 F | HEIGHT: 71 IN | SYSTOLIC BLOOD PRESSURE: 134 MMHG | WEIGHT: 234 LBS | BODY MASS INDEX: 32.76 KG/M2 | OXYGEN SATURATION: 98 %

## 2019-04-18 PROCEDURE — 99214 OFFICE O/P EST MOD 30 MIN: CPT

## 2019-04-18 NOTE — REVIEW OF SYSTEMS
[Fever] : fever [Fatigue] : fatigue [Sore Throat] : sore throat [Cough] : cough [Diarrhea] : diarrhea [Negative] : Eyes

## 2019-04-18 NOTE — PHYSICAL EXAM
[No Acute Distress] : no acute distress [Normal Oropharynx] : the oropharynx was normal [Normal TMs] : both tympanic membranes were normal [Supple] : supple [No JVD] : no jugular venous distention [Normal Rate] : normal rate  [No Respiratory Distress] : no respiratory distress  [Regular Rhythm] : with a regular rhythm [Normal S1, S2] : normal S1 and S2 [No Murmur] : no murmur heard [Soft] : abdomen soft [Non Tender] : non-tender [No HSM] : no HSM [Normal Bowel Sounds] : normal bowel sounds [de-identified] : Crackles at R mid-back; resolved with cough

## 2019-04-18 NOTE — HISTORY OF PRESENT ILLNESS
[FreeTextEntry8] : Here for acute visit for fever and cough.\par Tried ibuprofen for fever to 101F.\par Has also been having some pain in the back and also \par No vomiting.\par +diarrhea (BMs 2-3x/day)\par Both sons are sick.

## 2019-04-18 NOTE — ASSESSMENT
[FreeTextEntry1] : 49 y/o M here with fever/cough/sore throat x 2 days.\par Crackles noted on in R mid-lung.\par Suspect likely viral illness but would r/o pneumonia\par - Will refer for CXR\par - Tessalon prn\par - Supportive care.\par \par RTC if sx persist or worsen.

## 2019-04-22 ENCOUNTER — RX RENEWAL (OUTPATIENT)
Age: 49
End: 2019-04-22

## 2019-05-13 ENCOUNTER — APPOINTMENT (OUTPATIENT)
Dept: INTERNAL MEDICINE | Facility: CLINIC | Age: 49
End: 2019-05-13
Payer: MEDICAID

## 2019-05-13 ENCOUNTER — NON-APPOINTMENT (OUTPATIENT)
Age: 49
End: 2019-05-13

## 2019-05-13 VITALS
WEIGHT: 235 LBS | DIASTOLIC BLOOD PRESSURE: 76 MMHG | HEIGHT: 71 IN | BODY MASS INDEX: 32.9 KG/M2 | TEMPERATURE: 98.5 F | SYSTOLIC BLOOD PRESSURE: 132 MMHG | HEART RATE: 93 BPM | OXYGEN SATURATION: 97 %

## 2019-05-13 PROCEDURE — 99214 OFFICE O/P EST MOD 30 MIN: CPT | Mod: 25

## 2019-05-13 PROCEDURE — 90471 IMMUNIZATION ADMIN: CPT

## 2019-05-13 PROCEDURE — 36415 COLL VENOUS BLD VENIPUNCTURE: CPT

## 2019-05-13 PROCEDURE — 93000 ELECTROCARDIOGRAM COMPLETE: CPT

## 2019-05-13 PROCEDURE — 90750 HZV VACC RECOMBINANT IM: CPT

## 2019-05-13 RX ORDER — LORATADINE 10 MG/1
10 TABLET ORAL DAILY
Qty: 30 | Refills: 5 | Status: DISCONTINUED | COMMUNITY
Start: 2018-07-16 | End: 2019-05-13

## 2019-05-13 NOTE — ASSESSMENT
[FreeTextEntry1] : 1. DM A1C at goal. cont statin. utd eye , foot exam. utd microalb. discussed diet and exercise. \par 2. HTN at goal cont current meds\par 3. HM utd colo. due for hep b #2, shingrix.. \par 4. due for audiology exam\par 5. atypical cp at rest likely muscular given back pain no recurrence ekg today nsr nl axis/int no st t chg no pathological q waves. no chg prior. no red flag sx nor exertional sx. rec stress test given risk factors pt declined will do if episode recurs.

## 2019-05-13 NOTE — PHYSICAL EXAM
[No Acute Distress] : no acute distress [Well Nourished] : well nourished [Well-Appearing] : well-appearing [Well Developed] : well developed [Normal Sclera/Conjunctiva] : normal sclera/conjunctiva [PERRL] : pupils equal round and reactive to light [EOMI] : extraocular movements intact [Normal Outer Ear/Nose] : the outer ears and nose were normal in appearance [Normal Oropharynx] : the oropharynx was normal [No JVD] : no jugular venous distention [Supple] : supple [No Lymphadenopathy] : no lymphadenopathy [Thyroid Normal, No Nodules] : the thyroid was normal and there were no nodules present [No Respiratory Distress] : no respiratory distress  [Clear to Auscultation] : lungs were clear to auscultation bilaterally [No Accessory Muscle Use] : no accessory muscle use [Normal Rate] : normal rate  [Regular Rhythm] : with a regular rhythm [Normal S1, S2] : normal S1 and S2 [No Murmur] : no murmur heard [No Carotid Bruits] : no carotid bruits [Pedal Pulses Present] : the pedal pulses are present [No Edema] : there was no peripheral edema [No Extremity Clubbing/Cyanosis] : no extremity clubbing/cyanosis [Soft] : abdomen soft [Non Tender] : non-tender [Non-distended] : non-distended [No Masses] : no abdominal mass palpated [No HSM] : no HSM [Normal Bowel Sounds] : normal bowel sounds [Normal Posterior Cervical Nodes] : no posterior cervical lymphadenopathy [Normal Anterior Cervical Nodes] : no anterior cervical lymphadenopathy [No CVA Tenderness] : no CVA  tenderness [No Spinal Tenderness] : no spinal tenderness [No Joint Swelling] : no joint swelling [Grossly Normal Strength/Tone] : grossly normal strength/tone [No Rash] : no rash [Normal Gait] : normal gait [Coordination Grossly Intact] : coordination grossly intact [No Focal Deficits] : no focal deficits [Deep Tendon Reflexes (DTR)] : deep tendon reflexes were 2+ and symmetric [Normal Affect] : the affect was normal [Normal Mood] : the mood was normal [Normal Insight/Judgement] : insight and judgment were intact [Comprehensive Foot Exam Normal] : Right and left foot were examined and both feet are normal. No ulcers in either foot. Toes are normal and with full ROM.  Normal tactile sensation with monofilament testing throughout both feet

## 2019-05-13 NOTE — HISTORY OF PRESENT ILLNESS
[FreeTextEntry1] : dm fu [de-identified] : 1. DM Taking dm meds as directed. checking sugars occ run 120-130s pp and fasting. taking statin. utd ophtho exam. eating well. \par 2. HTN does not check at home taking meds as directed\par 3. HM utd colo. due for hep b #2, shingrix.. \par 4. occ tinnitus last audiology exam 2017 has not scheduled fu\par 5. one episode cp while driving last month. was in upper chest lasted few minutes then resolved. also felt in upper back. no sob, did feel warm during no nausea/vomiting. has been exercising since then no exertional sx. not sure if cardiac or muscular. walks up to 1h no cp, sob, edema, dizzyness.\par 10 point review of systems reviewed and negative except as above

## 2019-05-14 LAB
ALBUMIN SERPL ELPH-MCNC: 4.3 G/DL
ALP BLD-CCNC: 110 U/L
ALT SERPL-CCNC: 34 U/L
ANION GAP SERPL CALC-SCNC: 12 MMOL/L
AST SERPL-CCNC: 26 U/L
BASOPHILS # BLD AUTO: 0.05 K/UL
BASOPHILS NFR BLD AUTO: 0.7 %
BILIRUB SERPL-MCNC: 0.2 MG/DL
BUN SERPL-MCNC: 8 MG/DL
CALCIUM SERPL-MCNC: 9.3 MG/DL
CHLORIDE SERPL-SCNC: 101 MMOL/L
CHOLEST SERPL-MCNC: 135 MG/DL
CHOLEST/HDLC SERPL: 3.8 RATIO
CO2 SERPL-SCNC: 24 MMOL/L
CREAT SERPL-MCNC: 0.92 MG/DL
EOSINOPHIL # BLD AUTO: 0.22 K/UL
EOSINOPHIL NFR BLD AUTO: 2.9 %
ESTIMATED AVERAGE GLUCOSE: 160 MG/DL
GLUCOSE SERPL-MCNC: 225 MG/DL
HBA1C MFR BLD HPLC: 7.2 %
HCT VFR BLD CALC: 44.5 %
HDLC SERPL-MCNC: 36 MG/DL
HGB BLD-MCNC: 13.7 G/DL
IMM GRANULOCYTES NFR BLD AUTO: 0.3 %
LDLC SERPL CALC-MCNC: 55 MG/DL
LYMPHOCYTES # BLD AUTO: 2.73 K/UL
LYMPHOCYTES NFR BLD AUTO: 36.3 %
MAN DIFF?: NORMAL
MCHC RBC-ENTMCNC: 28.2 PG
MCHC RBC-ENTMCNC: 30.8 GM/DL
MCV RBC AUTO: 91.8 FL
MONOCYTES # BLD AUTO: 0.7 K/UL
MONOCYTES NFR BLD AUTO: 9.3 %
NEUTROPHILS # BLD AUTO: 3.8 K/UL
NEUTROPHILS NFR BLD AUTO: 50.5 %
PLATELET # BLD AUTO: 329 K/UL
POTASSIUM SERPL-SCNC: 5 MMOL/L
PROT SERPL-MCNC: 7.1 G/DL
RBC # BLD: 4.85 M/UL
RBC # FLD: 13.6 %
SODIUM SERPL-SCNC: 137 MMOL/L
TRIGL SERPL-MCNC: 220 MG/DL
VIT B12 SERPL-MCNC: 687 PG/ML
WBC # FLD AUTO: 7.52 K/UL

## 2019-08-12 ENCOUNTER — APPOINTMENT (OUTPATIENT)
Dept: INTERNAL MEDICINE | Facility: CLINIC | Age: 49
End: 2019-08-12
Payer: MEDICAID

## 2019-08-12 VITALS
TEMPERATURE: 98.6 F | HEART RATE: 79 BPM | WEIGHT: 236 LBS | SYSTOLIC BLOOD PRESSURE: 126 MMHG | DIASTOLIC BLOOD PRESSURE: 82 MMHG | OXYGEN SATURATION: 97 % | HEIGHT: 71 IN | BODY MASS INDEX: 33.04 KG/M2

## 2019-08-12 DIAGNOSIS — H93.13 TINNITUS, BILATERAL: ICD-10-CM

## 2019-08-12 DIAGNOSIS — R07.89 OTHER CHEST PAIN: ICD-10-CM

## 2019-08-12 LAB
CREAT SPEC-SCNC: 35 MG/DL
HBA1C MFR BLD HPLC: 7.7
MICROALBUMIN 24H UR DL<=1MG/L-MCNC: <1.2 MG/DL
MICROALBUMIN/CREAT 24H UR-RTO: NORMAL MG/G

## 2019-08-12 PROCEDURE — 83036 HEMOGLOBIN GLYCOSYLATED A1C: CPT | Mod: QW

## 2019-08-12 PROCEDURE — 99396 PREV VISIT EST AGE 40-64: CPT | Mod: 25

## 2019-08-12 RX ORDER — ZOSTER VACCINE RECOMBINANT, ADJUVANTED 50 MCG/0.5
50 KIT INTRAMUSCULAR
Qty: 1 | Refills: 0 | Status: DISCONTINUED | COMMUNITY
Start: 2019-02-11 | End: 2019-08-12

## 2019-08-12 NOTE — PHYSICAL EXAM
[No Acute Distress] : no acute distress [Well Nourished] : well nourished [Well Developed] : well developed [Well-Appearing] : well-appearing [Normal Sclera/Conjunctiva] : normal sclera/conjunctiva [PERRL] : pupils equal round and reactive to light [EOMI] : extraocular movements intact [Normal Outer Ear/Nose] : the outer ears and nose were normal in appearance [Normal Oropharynx] : the oropharynx was normal [Normal TMs] : both tympanic membranes were normal [No JVD] : no jugular venous distention [Supple] : supple [No Lymphadenopathy] : no lymphadenopathy [Thyroid Normal, No Nodules] : the thyroid was normal and there were no nodules present [No Accessory Muscle Use] : no accessory muscle use [No Respiratory Distress] : no respiratory distress  [Clear to Auscultation] : lungs were clear to auscultation bilaterally [Normal Rate] : normal rate  [Regular Rhythm] : with a regular rhythm [Normal S1, S2] : normal S1 and S2 [No Murmur] : no murmur heard [No Carotid Bruits] : no carotid bruits [No Varicosities] : no varicosities [Pedal Pulses Present] : the pedal pulses are present [No Edema] : there was no peripheral edema [No Extremity Clubbing/Cyanosis] : no extremity clubbing/cyanosis [Soft] : abdomen soft [Non Tender] : non-tender [Non-distended] : non-distended [No Masses] : no abdominal mass palpated [No HSM] : no HSM [Normal Supraclavicular Nodes] : no supraclavicular lymphadenopathy [Normal Bowel Sounds] : normal bowel sounds [Normal Posterior Cervical Nodes] : no posterior cervical lymphadenopathy [Normal Anterior Cervical Nodes] : no anterior cervical lymphadenopathy [No CVA Tenderness] : no CVA  tenderness [No Joint Swelling] : no joint swelling [No Spinal Tenderness] : no spinal tenderness [No Rash] : no rash [Grossly Normal Strength/Tone] : grossly normal strength/tone [Coordination Grossly Intact] : coordination grossly intact [No Focal Deficits] : no focal deficits [Deep Tendon Reflexes (DTR)] : deep tendon reflexes were 2+ and symmetric [Normal Gait] : normal gait [Normal Mood] : the mood was normal [Normal Affect] : the affect was normal [Normal Insight/Judgement] : insight and judgment were intact [Comprehensive Foot Exam Normal] : Right and left foot were examined and both feet are normal. No ulcers in either foot. Toes are normal and with full ROM.  Normal tactile sensation with monofilament testing throughout both feet [de-identified] : declined

## 2019-08-12 NOTE — HISTORY OF PRESENT ILLNESS
[FreeTextEntry1] : cpe [de-identified] : 1. DM Taking dm meds as directed. not checking sugars does not want to inc meds. taking statin. utd ophtho exam. eating poorly with family visiting\par 2. HTN does not check at home taking meds as directed\par 3. HM utd colo. due for shingrix\par 4. tinnitus resolved\par 5. cp has not recurred\par 10 point review of systems reviewed and negative except as above

## 2019-08-12 NOTE — ASSESSMENT
[FreeTextEntry1] : 1. DM a1c above goal rec inc mf pt declined. rec eating healhtier he will try. cont  statin. utd ophtho exam. feet look fine\par 2. HTN at goal utd labs\par 3. HM utd colo. due for shingrix #2 will do next visit\par 4. tinnitus resolved\par 5. cp has not recurred

## 2019-09-23 ENCOUNTER — MED ADMIN CHARGE (OUTPATIENT)
Age: 49
End: 2019-09-23

## 2019-09-23 ENCOUNTER — APPOINTMENT (OUTPATIENT)
Dept: INTERNAL MEDICINE | Facility: CLINIC | Age: 49
End: 2019-09-23
Payer: MEDICAID

## 2019-09-23 PROCEDURE — 90750 HZV VACC RECOMBINANT IM: CPT

## 2019-09-23 PROCEDURE — 90471 IMMUNIZATION ADMIN: CPT

## 2019-10-10 ENCOUNTER — TRANSCRIPTION ENCOUNTER (OUTPATIENT)
Age: 49
End: 2019-10-10

## 2019-10-17 ENCOUNTER — APPOINTMENT (OUTPATIENT)
Dept: OTOLARYNGOLOGY | Facility: CLINIC | Age: 49
End: 2019-10-17
Payer: MEDICAID

## 2019-10-17 VITALS
WEIGHT: 236 LBS | DIASTOLIC BLOOD PRESSURE: 80 MMHG | BODY MASS INDEX: 33.04 KG/M2 | SYSTOLIC BLOOD PRESSURE: 133 MMHG | HEIGHT: 71 IN | HEART RATE: 81 BPM

## 2019-10-17 PROCEDURE — G0268 REMOVAL OF IMPACTED WAX MD: CPT

## 2019-10-17 PROCEDURE — 92557 COMPREHENSIVE HEARING TEST: CPT

## 2019-10-17 PROCEDURE — 92567 TYMPANOMETRY: CPT

## 2019-10-17 PROCEDURE — 99213 OFFICE O/P EST LOW 20 MIN: CPT | Mod: 25

## 2019-10-17 NOTE — HISTORY OF PRESENT ILLNESS
[de-identified] : c/o problems with balance - also notes wax in ears and ears feel clogged.  Occ feels unsteady in am initially after waking.otherwise feels ok rest of the day.   No blackout or diplopia .  No prior ear problems

## 2019-10-17 NOTE — REASON FOR VISIT
[Subsequent Evaluation] : a subsequent evaluation for [Spouse] : spouse [FreeTextEntry2] : dizziness and both ear discomfort

## 2019-10-17 NOTE — PHYSICAL EXAM
[Midline] : trachea located in midline position [Normal] : no rashes [de-identified] : after cerumen removal  [de-identified] : right impacted cerumen; left clear

## 2019-10-17 NOTE — ASSESSMENT
[FreeTextEntry1] : Patient with feeling of pressure in ears and occ sl lightheaded - had cerumen - removed - exam normal but he does have some hearing  asymmetry.  Recommended flonase but also recommend MRI  due to hearing asymmetry. Follow up in 1-2 mos

## 2019-11-03 ENCOUNTER — FORM ENCOUNTER (OUTPATIENT)
Age: 49
End: 2019-11-03

## 2019-11-04 ENCOUNTER — APPOINTMENT (OUTPATIENT)
Dept: MRI IMAGING | Facility: IMAGING CENTER | Age: 49
End: 2019-11-04
Payer: MEDICAID

## 2019-11-04 ENCOUNTER — OUTPATIENT (OUTPATIENT)
Dept: OUTPATIENT SERVICES | Facility: HOSPITAL | Age: 49
LOS: 1 days | End: 2019-11-04
Payer: MEDICAID

## 2019-11-04 DIAGNOSIS — Z98.89 OTHER SPECIFIED POSTPROCEDURAL STATES: Chronic | ICD-10-CM

## 2019-11-04 DIAGNOSIS — H90.5 UNSPECIFIED SENSORINEURAL HEARING LOSS: ICD-10-CM

## 2019-11-04 PROCEDURE — 70553 MRI BRAIN STEM W/O & W/DYE: CPT

## 2019-11-04 PROCEDURE — A9585: CPT

## 2019-11-04 PROCEDURE — 70553 MRI BRAIN STEM W/O & W/DYE: CPT | Mod: 26

## 2019-12-16 ENCOUNTER — APPOINTMENT (OUTPATIENT)
Dept: INTERNAL MEDICINE | Facility: CLINIC | Age: 49
End: 2019-12-16
Payer: MEDICAID

## 2019-12-16 ENCOUNTER — NON-APPOINTMENT (OUTPATIENT)
Age: 49
End: 2019-12-16

## 2019-12-16 VITALS
DIASTOLIC BLOOD PRESSURE: 84 MMHG | HEART RATE: 74 BPM | WEIGHT: 229 LBS | OXYGEN SATURATION: 98 % | BODY MASS INDEX: 32.06 KG/M2 | SYSTOLIC BLOOD PRESSURE: 122 MMHG | HEIGHT: 71 IN | TEMPERATURE: 98.1 F

## 2019-12-16 DIAGNOSIS — H61.21 IMPACTED CERUMEN, RIGHT EAR: ICD-10-CM

## 2019-12-16 LAB — HBA1C MFR BLD HPLC: 7

## 2019-12-16 PROCEDURE — 93000 ELECTROCARDIOGRAM COMPLETE: CPT

## 2019-12-16 PROCEDURE — 99214 OFFICE O/P EST MOD 30 MIN: CPT | Mod: 25

## 2019-12-16 PROCEDURE — 90686 IIV4 VACC NO PRSV 0.5 ML IM: CPT

## 2019-12-16 PROCEDURE — 83036 HEMOGLOBIN GLYCOSYLATED A1C: CPT | Mod: QW

## 2019-12-16 PROCEDURE — G0008: CPT

## 2019-12-16 NOTE — PHYSICAL EXAM
[No Acute Distress] : no acute distress [Normal Oropharynx] : the oropharynx was normal [Normal Sclera/Conjunctiva] : normal sclera/conjunctiva [No JVD] : no jugular venous distention [No Lymphadenopathy] : no lymphadenopathy [No Respiratory Distress] : no respiratory distress  [No Accessory Muscle Use] : no accessory muscle use [Clear to Auscultation] : lungs were clear to auscultation bilaterally [Normal S1, S2] : normal S1 and S2 [Normal Rate] : normal rate  [Regular Rhythm] : with a regular rhythm [Pedal Pulses Present] : the pedal pulses are present [No HSM] : no HSM [No Edema] : there was no peripheral edema [Normal Anterior Cervical Nodes] : no anterior cervical lymphadenopathy [No Spinal Tenderness] : no spinal tenderness [Normal Posterior Cervical Nodes] : no posterior cervical lymphadenopathy [Grossly Normal Strength/Tone] : grossly normal strength/tone [No Rash] : no rash [No Joint Swelling] : no joint swelling [Normal Mood] : the mood was normal [Coordination Grossly Intact] : coordination grossly intact [Comprehensive Foot Exam Normal] : Right and left foot were examined and both feet are normal. No ulcers in either foot. Toes are normal and with full ROM.  Normal tactile sensation with monofilament testing throughout both feet

## 2019-12-16 NOTE — HISTORY OF PRESENT ILLNESS
[de-identified] : 1. DM eating has been better. not walking. taking dm meds as directed. checked sugars once 120 fasting 140 pp. taking statin. utd ophtho exam. \par 2. HTN does not check at home taking meds as directed\par 3. HM utd colo. flu soht today \par 4. tinnitus resolved\par 5. cp has not recurred but noted vidal going up [address] stairs last week. had been walking daily but stopped when weather got colder in october. normally does not climb stairs. at home walking 5 stairs feels fine. no edema. no cough. no fever. no cp. \par 10 point review of systems reviewed and negative except as above

## 2019-12-16 NOTE — ASSESSMENT
[FreeTextEntry1] : 1. DM A1C at goal. cont statin. utd eye , foot exam. utd microalb. discussed diet and exercise. \par 2. HTN at goal cont current meds. labs next visit. \par 3. HM utd colo. flu shot today \par 4. tinnitus resolved\par 5. given one episode of cp and another episode of exertional dyspnea and multiple risk factors (dm, htn, hl, obesity) and normal ekg today will send for exercise stress test. ddx includes deconditioning. euvolemic on exam today. ekg nsr nl axis/int no st t chg no pathological q waves. no chg prior. 
Yes

## 2020-04-01 ENCOUNTER — OUTPATIENT (OUTPATIENT)
Dept: OUTPATIENT SERVICES | Facility: HOSPITAL | Age: 50
LOS: 1 days | End: 2020-04-01
Payer: MEDICAID

## 2020-04-01 DIAGNOSIS — Z98.89 OTHER SPECIFIED POSTPROCEDURAL STATES: Chronic | ICD-10-CM

## 2020-04-01 PROCEDURE — G9001: CPT

## 2020-04-21 ENCOUNTER — APPOINTMENT (OUTPATIENT)
Dept: INTERNAL MEDICINE | Facility: CLINIC | Age: 50
End: 2020-04-21

## 2020-05-05 DIAGNOSIS — Z71.89 OTHER SPECIFIED COUNSELING: ICD-10-CM

## 2020-06-26 ENCOUNTER — NON-APPOINTMENT (OUTPATIENT)
Age: 50
End: 2020-06-26

## 2020-06-26 ENCOUNTER — TRANSCRIPTION ENCOUNTER (OUTPATIENT)
Age: 50
End: 2020-06-26

## 2020-07-06 ENCOUNTER — APPOINTMENT (OUTPATIENT)
Dept: INTERNAL MEDICINE | Facility: CLINIC | Age: 50
End: 2020-07-06
Payer: MEDICAID

## 2020-07-06 VITALS
HEART RATE: 82 BPM | DIASTOLIC BLOOD PRESSURE: 78 MMHG | SYSTOLIC BLOOD PRESSURE: 124 MMHG | BODY MASS INDEX: 31.66 KG/M2 | OXYGEN SATURATION: 98 % | WEIGHT: 227 LBS

## 2020-07-06 DIAGNOSIS — L21.9 SEBORRHEIC DERMATITIS, UNSPECIFIED: ICD-10-CM

## 2020-07-06 PROCEDURE — 99214 OFFICE O/P EST MOD 30 MIN: CPT | Mod: 25

## 2020-07-06 PROCEDURE — 36415 COLL VENOUS BLD VENIPUNCTURE: CPT

## 2020-07-06 NOTE — ASSESSMENT
[FreeTextEntry1] : 1. Mid back pain likely muscular no red flag sx rec stretching, nsaids prn, heat, supportive footwear. Call if any new or worsening symptoms. improved. to pt if worsens. \par 2. DM check labs today cont diabetic diet. \par 3. Ulnar neuropathy rec elbow brace\par 4. Seb derm sent cream

## 2020-07-06 NOTE — PHYSICAL EXAM
[No Acute Distress] : no acute distress [Normal Oropharynx] : the oropharynx was normal [Well Nourished] : well nourished [No Accessory Muscle Use] : no accessory muscle use [Clear to Auscultation] : lungs were clear to auscultation bilaterally [No Lymphadenopathy] : no lymphadenopathy [Normal Rate] : normal rate  [Regular Rhythm] : with a regular rhythm [No Edema] : there was no peripheral edema [No Spinal Tenderness] : no spinal tenderness [Normal Mood] : the mood was normal [de-identified] : + paraspinal min ttp worse with bending fwd and side

## 2020-07-06 NOTE — HISTORY OF PRESENT ILLNESS
[de-identified] : 1. Mid back pain paraspinal no trauma. pain is paraspinal. no numbness, weakness. no bowel/bladder incontinence. no wt loss. no fever. no waking from sleep. used nsaids x 10d improved. \par 2. DM sugars low 100s\par 3. Ulnar neuropathy feels numbness occ bl pinkie fingers in hands\par 4. Seb derm uses cream fungal/steroid which helps. \par 10 point review of systems reviewed and negative except as above

## 2020-07-07 LAB
25(OH)D3 SERPL-MCNC: 31 NG/ML
ALBUMIN SERPL ELPH-MCNC: 4.7 G/DL
ALP BLD-CCNC: 90 U/L
ALT SERPL-CCNC: 18 U/L
ANION GAP SERPL CALC-SCNC: 14 MMOL/L
AST SERPL-CCNC: 18 U/L
BILIRUB SERPL-MCNC: 0.4 MG/DL
BUN SERPL-MCNC: 12 MG/DL
CALCIUM SERPL-MCNC: 10.1 MG/DL
CHLORIDE SERPL-SCNC: 103 MMOL/L
CHOLEST SERPL-MCNC: 130 MG/DL
CHOLEST/HDLC SERPL: 3.9 RATIO
CO2 SERPL-SCNC: 24 MMOL/L
CREAT SERPL-MCNC: 1.08 MG/DL
ESTIMATED AVERAGE GLUCOSE: 131 MG/DL
GLUCOSE SERPL-MCNC: 122 MG/DL
HBA1C MFR BLD HPLC: 6.2 %
HDLC SERPL-MCNC: 33 MG/DL
LDLC SERPL CALC-MCNC: 47 MG/DL
POTASSIUM SERPL-SCNC: 4.8 MMOL/L
PROT SERPL-MCNC: 7.1 G/DL
SODIUM SERPL-SCNC: 141 MMOL/L
TRIGL SERPL-MCNC: 246 MG/DL
VIT B12 SERPL-MCNC: 826 PG/ML

## 2020-09-03 ENCOUNTER — RX RENEWAL (OUTPATIENT)
Age: 50
End: 2020-09-03

## 2020-12-16 PROBLEM — Z87.09 HISTORY OF PHARYNGITIS: Status: RESOLVED | Noted: 2018-05-15 | Resolved: 2020-12-16

## 2021-01-25 ENCOUNTER — APPOINTMENT (OUTPATIENT)
Dept: INTERNAL MEDICINE | Facility: CLINIC | Age: 51
End: 2021-01-25
Payer: MEDICAID

## 2021-01-25 VITALS
TEMPERATURE: 98.2 F | BODY MASS INDEX: 32.48 KG/M2 | SYSTOLIC BLOOD PRESSURE: 148 MMHG | HEART RATE: 91 BPM | HEIGHT: 71 IN | DIASTOLIC BLOOD PRESSURE: 82 MMHG | OXYGEN SATURATION: 98 % | WEIGHT: 232 LBS

## 2021-01-25 VITALS — SYSTOLIC BLOOD PRESSURE: 130 MMHG | DIASTOLIC BLOOD PRESSURE: 82 MMHG

## 2021-01-25 DIAGNOSIS — H69.83 OTHER SPECIFIED DISORDERS OF EUSTACHIAN TUBE, BILATERAL: ICD-10-CM

## 2021-01-25 DIAGNOSIS — Z87.39 PERSONAL HISTORY OF OTHER DISEASES OF THE MUSCULOSKELETAL SYSTEM AND CONNECTIVE TISSUE: ICD-10-CM

## 2021-01-25 DIAGNOSIS — M72.1 KNUCKLE PADS: ICD-10-CM

## 2021-01-25 DIAGNOSIS — G56.20 LESION OF ULNAR NERVE, UNSPECIFIED UPPER LIMB: ICD-10-CM

## 2021-01-25 DIAGNOSIS — Z87.2 PERSONAL HISTORY OF DISEASES OF THE SKIN AND SUBCUTANEOUS TISSUE: ICD-10-CM

## 2021-01-25 DIAGNOSIS — Z71.84 ENC FOR HEALTH COUNSELING RELATED TO TRAVEL: ICD-10-CM

## 2021-01-25 LAB
BASOPHILS # BLD AUTO: 0.04 K/UL
BASOPHILS NFR BLD AUTO: 0.6 %
EOSINOPHIL # BLD AUTO: 0.12 K/UL
EOSINOPHIL NFR BLD AUTO: 1.7 %
HCT VFR BLD CALC: 42.9 %
HGB BLD-MCNC: 13.6 G/DL
IMM GRANULOCYTES NFR BLD AUTO: 0.3 %
LYMPHOCYTES # BLD AUTO: 2.58 K/UL
LYMPHOCYTES NFR BLD AUTO: 35.7 %
MAN DIFF?: NORMAL
MCHC RBC-ENTMCNC: 28.6 PG
MCHC RBC-ENTMCNC: 31.7 GM/DL
MCV RBC AUTO: 90.1 FL
MONOCYTES # BLD AUTO: 0.55 K/UL
MONOCYTES NFR BLD AUTO: 7.6 %
NEUTROPHILS # BLD AUTO: 3.91 K/UL
NEUTROPHILS NFR BLD AUTO: 54.1 %
PLATELET # BLD AUTO: 266 K/UL
RBC # BLD: 4.76 M/UL
RBC # FLD: 12.7 %
WBC # FLD AUTO: 7.22 K/UL

## 2021-01-25 PROCEDURE — 93000 ELECTROCARDIOGRAM COMPLETE: CPT

## 2021-01-25 PROCEDURE — 99396 PREV VISIT EST AGE 40-64: CPT | Mod: 25

## 2021-01-25 PROCEDURE — 99072 ADDL SUPL MATRL&STAF TM PHE: CPT

## 2021-01-25 PROCEDURE — 99214 OFFICE O/P EST MOD 30 MIN: CPT | Mod: 25

## 2021-01-25 NOTE — ASSESSMENT
[FreeTextEntry1] : Given high dose statin will switch to pravastatin given biceps ttp\par given cardiac risk factors and [facility]UTSON will send for stress test. no worsening sx. likely doconditioning. rec daily asa. \par tmj cont bite guard, can use diclofenac gel\par rec check bp, sugars occ\par bp at goal today\par check a1c cont current dm meds if worsening a1c will inc diabetes meds. \par due for audio fu for hearing loss\par restart flonase. \par \par ekg nsr nl axis/int no st t chg no pathological q waves. no chg prior.

## 2021-01-25 NOTE — PHYSICAL EXAM
[No Acute Distress] : no acute distress [Well Nourished] : well nourished [Well Developed] : well developed [Well-Appearing] : well-appearing [Normal Sclera/Conjunctiva] : normal sclera/conjunctiva [PERRL] : pupils equal round and reactive to light [EOMI] : extraocular movements intact [Normal Outer Ear/Nose] : the outer ears and nose were normal in appearance [Normal Oropharynx] : the oropharynx was normal [No JVD] : no jugular venous distention [Supple] : supple [No Lymphadenopathy] : no lymphadenopathy [Thyroid Normal, No Nodules] : the thyroid was normal and there were no nodules present [No Respiratory Distress] : no respiratory distress  [No Accessory Muscle Use] : no accessory muscle use [Clear to Auscultation] : lungs were clear to auscultation bilaterally [Normal Rate] : normal rate  [Regular Rhythm] : with a regular rhythm [Normal S1, S2] : normal S1 and S2 [No Murmur] : no murmur heard [No Carotid Bruits] : no carotid bruits [No Abdominal Bruit] : a ~M bruit was not heard ~T in the abdomen [No Varicosities] : no varicosities [Pedal Pulses Present] : the pedal pulses are present [No Palpable Aorta] : no palpable aorta [No Edema] : there was no peripheral edema [No Extremity Clubbing/Cyanosis] : no extremity clubbing/cyanosis [Soft] : abdomen soft [Non Tender] : non-tender [Non-distended] : non-distended [No Masses] : no abdominal mass palpated [No HSM] : no HSM [Normal Bowel Sounds] : normal bowel sounds [Normal Posterior Cervical Nodes] : no posterior cervical lymphadenopathy [Normal Anterior Cervical Nodes] : no anterior cervical lymphadenopathy [No CVA Tenderness] : no CVA  tenderness [No Spinal Tenderness] : no spinal tenderness [No Joint Swelling] : no joint swelling [Grossly Normal Strength/Tone] : grossly normal strength/tone [No Rash] : no rash [Coordination Grossly Intact] : coordination grossly intact [No Focal Deficits] : no focal deficits [Normal Gait] : normal gait [Normal Affect] : the affect was normal [Deep Tendon Reflexes (DTR)] : deep tendon reflexes were 2+ and symmetric [Normal Mood] : the mood was normal [Normal Insight/Judgement] : insight and judgment were intact [Comprehensive Foot Exam Normal] : Right and left foot were examined and both feet are normal. No ulcers in either foot. Toes are normal and with full ROM.  Normal tactile sensation with monofilament testing throughout both feet [de-identified] : boggy nasal mucosa. nl mouth opening min ttp tmj joint

## 2021-01-25 NOTE — HISTORY OF PRESENT ILLNESS
[de-identified] : Here for cpe\par Notes pain bl biceps. not really related to activity. drives 6-7 h per day. some days notices other days does not. no joint pain. no swelling or rash. taking statin\par notes pain in bl jaws. worse with chewing and grinding teeth uses bite guard. sees dentist. \par does not check sugars or bp \par hm utd colo vaccines. \par moods ok. due for eye exam, foot exam\par still noting vidal notes does no exercise can walk up 6 step in house but sometimes gets winded coming out of car. no cp. no swelling. \par due for audio fu for hearing loss\par notes hardness inside nose\par 10 point review of systems reviewed and negative except as above

## 2021-01-26 LAB
25(OH)D3 SERPL-MCNC: 27.9 NG/ML
ALBUMIN SERPL ELPH-MCNC: 4.3 G/DL
ALP BLD-CCNC: 98 U/L
ALT SERPL-CCNC: 36 U/L
ANION GAP SERPL CALC-SCNC: 12 MMOL/L
AST SERPL-CCNC: 26 U/L
BILIRUB SERPL-MCNC: 0.3 MG/DL
BUN SERPL-MCNC: 8 MG/DL
CALCIUM SERPL-MCNC: 10 MG/DL
CHLORIDE SERPL-SCNC: 101 MMOL/L
CHOLEST SERPL-MCNC: 150 MG/DL
CK SERPL-CCNC: 105 U/L
CO2 SERPL-SCNC: 24 MMOL/L
CREAT SERPL-MCNC: 1.02 MG/DL
ESTIMATED AVERAGE GLUCOSE: 174 MG/DL
GLUCOSE SERPL-MCNC: 260 MG/DL
HBA1C MFR BLD HPLC: 7.7 %
HDLC SERPL-MCNC: 38 MG/DL
LDLC SERPL CALC-MCNC: 68 MG/DL
NONHDLC SERPL-MCNC: 112 MG/DL
POTASSIUM SERPL-SCNC: 4.5 MMOL/L
PROT SERPL-MCNC: 7.1 G/DL
SODIUM SERPL-SCNC: 137 MMOL/L
TRIGL SERPL-MCNC: 218 MG/DL
TSH SERPL-ACNC: 3.29 UIU/ML

## 2021-02-10 ENCOUNTER — NON-APPOINTMENT (OUTPATIENT)
Age: 51
End: 2021-02-10

## 2021-02-13 ENCOUNTER — TRANSCRIPTION ENCOUNTER (OUTPATIENT)
Age: 51
End: 2021-02-13

## 2021-02-22 ENCOUNTER — APPOINTMENT (OUTPATIENT)
Dept: INTERNAL MEDICINE | Facility: CLINIC | Age: 51
End: 2021-02-22
Payer: MEDICAID

## 2021-02-22 PROCEDURE — 99441: CPT

## 2021-02-22 RX ORDER — PRAVASTATIN SODIUM 40 MG/1
40 TABLET ORAL
Qty: 90 | Refills: 1 | Status: DISCONTINUED | COMMUNITY
Start: 2021-01-25 | End: 2021-02-22

## 2021-02-26 ENCOUNTER — RX RENEWAL (OUTPATIENT)
Age: 51
End: 2021-02-26

## 2021-03-01 ENCOUNTER — APPOINTMENT (OUTPATIENT)
Dept: OTOLARYNGOLOGY | Facility: CLINIC | Age: 51
End: 2021-03-01
Payer: MEDICAID

## 2021-03-01 VITALS — BODY MASS INDEX: 32.2 KG/M2 | HEIGHT: 71 IN | WEIGHT: 230 LBS | TEMPERATURE: 98 F

## 2021-03-01 DIAGNOSIS — H90.A22 SENSORINEURAL HEARING LOSS, UNILATERAL, LEFT EAR, WITH RESTRICTED HEARING ON THE CONTRALATERAL SIDE: ICD-10-CM

## 2021-03-01 PROCEDURE — 92567 TYMPANOMETRY: CPT

## 2021-03-01 PROCEDURE — 99072 ADDL SUPL MATRL&STAF TM PHE: CPT

## 2021-03-01 PROCEDURE — 99213 OFFICE O/P EST LOW 20 MIN: CPT

## 2021-03-01 PROCEDURE — 92557 COMPREHENSIVE HEARING TEST: CPT

## 2021-03-01 NOTE — PHYSICAL EXAM
[Midline] : trachea located in midline position [Normal] : no rashes [de-identified] : right impacted cerumen; left clear  [de-identified] : after cerumen removal

## 2021-03-01 NOTE — HISTORY OF PRESENT ILLNESS
[de-identified] : c/o here for tinnitus in ears.  Here for follow up .  Last visit was 2019 - did have some hearing asymmetry.  No other complaints.   Did have prior asymmetric snhl worse in left ear - had negative MRI.

## 2021-03-01 NOTE — ASSESSMENT
[FreeTextEntry1] : Patient here for follow up of asymmetric left snhl.  No symptoms at this time.  Ears clear and audio unchanged. Recommended conservative care and follow up in one year and as necessary

## 2021-03-15 ENCOUNTER — APPOINTMENT (OUTPATIENT)
Dept: CARDIOLOGY | Facility: CLINIC | Age: 51
End: 2021-03-15
Payer: MEDICAID

## 2021-03-15 PROCEDURE — 93015 CV STRESS TEST SUPVJ I&R: CPT

## 2021-03-15 PROCEDURE — 99072 ADDL SUPL MATRL&STAF TM PHE: CPT

## 2021-04-19 ENCOUNTER — APPOINTMENT (OUTPATIENT)
Dept: INTERNAL MEDICINE | Facility: CLINIC | Age: 51
End: 2021-04-19
Payer: MEDICAID

## 2021-04-19 ENCOUNTER — RESULT CHARGE (OUTPATIENT)
Age: 51
End: 2021-04-19

## 2021-04-19 VITALS
HEIGHT: 71 IN | SYSTOLIC BLOOD PRESSURE: 125 MMHG | TEMPERATURE: 98.1 F | OXYGEN SATURATION: 98 % | BODY MASS INDEX: 31.92 KG/M2 | WEIGHT: 228 LBS | HEART RATE: 88 BPM | DIASTOLIC BLOOD PRESSURE: 70 MMHG

## 2021-04-19 DIAGNOSIS — R06.00 DYSPNEA, UNSPECIFIED: ICD-10-CM

## 2021-04-19 DIAGNOSIS — H90.5 UNSPECIFIED SENSORINEURAL HEARING LOSS: ICD-10-CM

## 2021-04-19 PROCEDURE — 99214 OFFICE O/P EST MOD 30 MIN: CPT

## 2021-04-19 PROCEDURE — 99072 ADDL SUPL MATRL&STAF TM PHE: CPT

## 2021-04-19 PROCEDURE — 83036 HEMOGLOBIN GLYCOSYLATED A1C: CPT | Mod: QW

## 2021-04-19 NOTE — PHYSICAL EXAM
[No Acute Distress] : no acute distress [Normal Oropharynx] : the oropharynx was normal [No Lymphadenopathy] : no lymphadenopathy [Supple] : supple [No Accessory Muscle Use] : no accessory muscle use [Clear to Auscultation] : lungs were clear to auscultation bilaterally [Normal Rate] : normal rate  [Regular Rhythm] : with a regular rhythm [Normal S1, S2] : normal S1 and S2 [No Edema] : there was no peripheral edema [No Joint Swelling] : no joint swelling [Normal Gait] : normal gait [Normal Mood] : the mood was normal

## 2021-04-19 NOTE — ASSESSMENT
[FreeTextEntry1] : \par pain has resolved in biceps\par tmj relieved \par bp at goal cont current meds\par A1C at goal. cont statin. due for eye exam, utd foot exam. utd microalb. discussed diet and exercise. discussed risk and benefit of jardiance pt would like to try. \par hm utd colo vaccines. got pfizer vaccine. \par cont walking\par due for audio fu for hearing loss\par rhinitis sent spray.

## 2021-04-19 NOTE — HISTORY OF PRESENT ILLNESS
[de-identified] : Here for fu\par pain has resolved in biceps\par tmj relieved \par does not check bp\par sugars 120-130 checks infrequently. willing to try jardiance for add'l idabetes control. a1c today. taking statin.\par hm utd colo vaccines. got pfizer vaccine. \par moods ok. due for eye exam, foot exam\par walking no sob, cp. \par due for audio fu for hearing loss\par rhinitis req spray. \par 10 point review of systems reviewed and negative except as above

## 2021-05-12 ENCOUNTER — RX RENEWAL (OUTPATIENT)
Age: 51
End: 2021-05-12

## 2021-06-13 ENCOUNTER — NON-APPOINTMENT (OUTPATIENT)
Age: 51
End: 2021-06-13

## 2021-06-14 ENCOUNTER — NON-APPOINTMENT (OUTPATIENT)
Age: 51
End: 2021-06-14

## 2021-06-28 ENCOUNTER — APPOINTMENT (OUTPATIENT)
Dept: INTERNAL MEDICINE | Facility: CLINIC | Age: 51
End: 2021-06-28
Payer: MEDICAID

## 2021-06-28 VITALS
SYSTOLIC BLOOD PRESSURE: 130 MMHG | HEART RATE: 85 BPM | BODY MASS INDEX: 32.06 KG/M2 | OXYGEN SATURATION: 98 % | DIASTOLIC BLOOD PRESSURE: 80 MMHG | TEMPERATURE: 98.4 F | HEIGHT: 71 IN | WEIGHT: 229 LBS

## 2021-06-28 LAB — HBA1C MFR BLD HPLC: 7.2

## 2021-06-28 PROCEDURE — 83036 HEMOGLOBIN GLYCOSYLATED A1C: CPT | Mod: QW

## 2021-06-28 PROCEDURE — 99214 OFFICE O/P EST MOD 30 MIN: CPT | Mod: 25

## 2021-06-28 NOTE — PHYSICAL EXAM
[No Acute Distress] : no acute distress [No Respiratory Distress] : no respiratory distress  [No Edema] : there was no peripheral edema [No Spinal Tenderness] : no spinal tenderness [Grossly Normal Strength/Tone] : grossly normal strength/tone [de-identified] : + ps upper lumbar min ttp bl nl bending [de-identified] :  nl strength/sensation bl le and ue

## 2021-06-28 NOTE — ASSESSMENT
[FreeTextEntry1] : mid back pain likely muscular no red flag sx rec heating pad, stretching, diclofenac gel, prn bedtime muscle relaxant avoid prolonged sitting offered pt, pt declined. will call if not improving. \par dm a1c above goal will reset jardiance as pt unable to obtain. discussed diet/exercise.  cont statin. utd eye , foot exam.discussed diet and exercise. \par hm utd covid vaccine. utd colo.

## 2021-06-28 NOTE — HISTORY OF PRESENT ILLNESS
[de-identified] : Here for mid back pain. Did PT for lower R back pain which helped. Few months ago bl mid back pain started. only happens when gets up afer sitting for over 15 min. no pain with walking. pain is paraspinal. no numbness, weakness. no bowel/bladder incontinence. no wt loss. no fever. no waking from sleep. feels like muscular pain.\par dm well controlled does not check sugars often. taking meds as directed. no low sugars. taking statin. utd ophtho exam. feet feel fine.\par hm utd covid vaccine. utd colo.

## 2021-08-19 ENCOUNTER — RX RENEWAL (OUTPATIENT)
Age: 51
End: 2021-08-19

## 2021-09-22 ENCOUNTER — RX RENEWAL (OUTPATIENT)
Age: 51
End: 2021-09-22

## 2021-09-27 ENCOUNTER — RESULT CHARGE (OUTPATIENT)
Age: 51
End: 2021-09-27

## 2021-09-27 ENCOUNTER — APPOINTMENT (OUTPATIENT)
Dept: INTERNAL MEDICINE | Facility: CLINIC | Age: 51
End: 2021-09-27
Payer: MEDICAID

## 2021-09-27 VITALS
TEMPERATURE: 98.5 F | OXYGEN SATURATION: 98 % | BODY MASS INDEX: 31.8 KG/M2 | HEART RATE: 74 BPM | SYSTOLIC BLOOD PRESSURE: 134 MMHG | WEIGHT: 228 LBS | DIASTOLIC BLOOD PRESSURE: 72 MMHG

## 2021-09-27 DIAGNOSIS — J30.2 OTHER SEASONAL ALLERGIC RHINITIS: ICD-10-CM

## 2021-09-27 DIAGNOSIS — Z23 ENCOUNTER FOR IMMUNIZATION: ICD-10-CM

## 2021-09-27 DIAGNOSIS — Z87.39 PERSONAL HISTORY OF OTHER DISEASES OF THE MUSCULOSKELETAL SYSTEM AND CONNECTIVE TISSUE: ICD-10-CM

## 2021-09-27 PROCEDURE — 99214 OFFICE O/P EST MOD 30 MIN: CPT | Mod: 25

## 2021-09-27 PROCEDURE — 90686 IIV4 VACC NO PRSV 0.5 ML IM: CPT

## 2021-09-27 PROCEDURE — G0008: CPT

## 2021-09-27 NOTE — ASSESSMENT
[FreeTextEntry1] : Mid back pain improved\par htn at goal at home\par hl taking statin\par penile rash declined exam trial of nyst/tac cream fu for urologist if not improving\par going to [place] for 3w req mefloquin tolerated last trip. took typhoid vaccine last trip 2y ago\par dm A1C at goal. cont statin. utd eye , foot exam. utd microalb. discussed diet and exercise. \par hm utd covid vaccine. utd colo. flu shot

## 2021-09-27 NOTE — HISTORY OF PRESENT ILLNESS
[de-identified] : Mid back pain improved\par htn at goal at home\par hl taking statin\par penile rash declines exam itchy red little bumps no other rash\par going to [place] for 3w req mefloquin tolerated last trip. took typhoid vaccine last trip.\par dm well controlled does not check sugars often. taking meds as directed. no low sugars. taking statin. utd ophtho exam. feet feel fine.\par hm utd covid vaccine. utd colo. flu shot

## 2021-09-29 ENCOUNTER — TRANSCRIPTION ENCOUNTER (OUTPATIENT)
Age: 51
End: 2021-09-29

## 2021-10-14 ENCOUNTER — RX RENEWAL (OUTPATIENT)
Age: 51
End: 2021-10-14

## 2021-12-15 ENCOUNTER — RX RENEWAL (OUTPATIENT)
Age: 51
End: 2021-12-15

## 2021-12-27 ENCOUNTER — APPOINTMENT (OUTPATIENT)
Dept: GASTROENTEROLOGY | Facility: CLINIC | Age: 51
End: 2021-12-27

## 2022-01-03 ENCOUNTER — APPOINTMENT (OUTPATIENT)
Dept: INTERNAL MEDICINE | Facility: CLINIC | Age: 52
End: 2022-01-03
Payer: MEDICAID

## 2022-01-03 VITALS
OXYGEN SATURATION: 98 % | SYSTOLIC BLOOD PRESSURE: 149 MMHG | TEMPERATURE: 98.7 F | HEART RATE: 83 BPM | HEIGHT: 71 IN | BODY MASS INDEX: 32.2 KG/M2 | DIASTOLIC BLOOD PRESSURE: 88 MMHG | WEIGHT: 230 LBS

## 2022-01-03 DIAGNOSIS — M54.9 DORSALGIA, UNSPECIFIED: ICD-10-CM

## 2022-01-03 PROCEDURE — 99214 OFFICE O/P EST MOD 30 MIN: CPT | Mod: 25

## 2022-01-03 PROCEDURE — 83036 HEMOGLOBIN GLYCOSYLATED A1C: CPT | Mod: QW

## 2022-01-03 RX ORDER — CHLORHEXIDINE GLUCONATE 4 %
1000 LIQUID (ML) TOPICAL
Qty: 90 | Refills: 1 | Status: ACTIVE | COMMUNITY
Start: 2017-10-19 | End: 1900-01-01

## 2022-01-03 NOTE — ASSESSMENT
[FreeTextEntry1] : Mid back pain rec pt, stretching, heat. no red flag sx.\par suspect bppv no red flag sn/sx rec epley maneuver.  hold alcohol check bloodwork \par htn restart lisinopril hold alcohol fu 3w with updated bp at home \par hl cont statin\par dm A1C at goal. cont statin. utd eye , foot exam. utd microalb. discussed diet and exercise. will send jardiacne and request pa\par hm utd covid vaccine. utd colo. flu shot going for rpt colo.

## 2022-01-03 NOTE — PHYSICAL EXAM
[No Acute Distress] : no acute distress [Normal Sclera/Conjunctiva] : normal sclera/conjunctiva [No Respiratory Distress] : no respiratory distress  [Normal Rate] : normal rate  [Pedal Pulses Present] : the pedal pulses are present [No Edema] : there was no peripheral edema [No Spinal Tenderness] : no spinal tenderness [No Joint Swelling] : no joint swelling [Comprehensive Foot Exam Normal] : Right and left foot were examined and both feet are normal. No ulcers in either foot. Toes are normal and with full ROM.  Normal tactile sensation with monofilament testing throughout both feet [de-identified] : + ps ttp upper thoracic ps ttp  [de-identified] : cn 2-12 intact. nl strength/sensation bl le and ue + artem hallpike

## 2022-01-03 NOTE — HISTORY OF PRESENT ILLNESS
[de-identified] : Mid back pain occ with driving. paraspinal nonradiating. \par occ dizzyness when he sits up in the morning. spinning for few seconds. does not recur during day. no double vision, vision chg, n/v, chg in walking. \par htn stopped medication 2d ago drank 4 drinks last night. \par hl taking statin\par penile rash gone\par dm well controlled does not check sugars often. taking meds as directed. no low sugars. taking statin. utd ophtho exam. feet feel fine.\par hm utd covid vaccine. utd colo. flu shot going for rpt colo. \par

## 2022-01-04 LAB
ALBUMIN SERPL ELPH-MCNC: 4.6 G/DL
ALP BLD-CCNC: 110 U/L
ALT SERPL-CCNC: 43 U/L
ANION GAP SERPL CALC-SCNC: 14 MMOL/L
AST SERPL-CCNC: 30 U/L
BILIRUB SERPL-MCNC: 0.3 MG/DL
BUN SERPL-MCNC: 9 MG/DL
CALCIUM SERPL-MCNC: 9.9 MG/DL
CHLORIDE SERPL-SCNC: 101 MMOL/L
CHOLEST SERPL-MCNC: 173 MG/DL
CO2 SERPL-SCNC: 23 MMOL/L
CREAT SERPL-MCNC: 0.98 MG/DL
GLUCOSE SERPL-MCNC: 176 MG/DL
HDLC SERPL-MCNC: 38 MG/DL
LDLC SERPL CALC-MCNC: 91 MG/DL
NONHDLC SERPL-MCNC: 135 MG/DL
POTASSIUM SERPL-SCNC: 4.6 MMOL/L
PROT SERPL-MCNC: 7.3 G/DL
SODIUM SERPL-SCNC: 138 MMOL/L
TRIGL SERPL-MCNC: 218 MG/DL
VIT B12 SERPL-MCNC: 667 PG/ML

## 2022-02-22 ENCOUNTER — NON-APPOINTMENT (OUTPATIENT)
Age: 52
End: 2022-02-22

## 2022-02-23 ENCOUNTER — NON-APPOINTMENT (OUTPATIENT)
Age: 52
End: 2022-02-23

## 2022-04-04 ENCOUNTER — APPOINTMENT (OUTPATIENT)
Dept: INTERNAL MEDICINE | Facility: CLINIC | Age: 52
End: 2022-04-04
Payer: MEDICAID

## 2022-04-04 VITALS
BODY MASS INDEX: 30.96 KG/M2 | WEIGHT: 222 LBS | SYSTOLIC BLOOD PRESSURE: 136 MMHG | DIASTOLIC BLOOD PRESSURE: 86 MMHG | HEART RATE: 92 BPM | OXYGEN SATURATION: 97 % | TEMPERATURE: 98.5 F

## 2022-04-04 DIAGNOSIS — M54.32 SCIATICA, LEFT SIDE: ICD-10-CM

## 2022-04-04 PROCEDURE — 99214 OFFICE O/P EST MOD 30 MIN: CPT | Mod: 25

## 2022-04-04 PROCEDURE — 99396 PREV VISIT EST AGE 40-64: CPT

## 2022-04-04 RX ORDER — BENZONATATE 100 MG/1
100 CAPSULE ORAL EVERY 8 HOURS
Qty: 90 | Refills: 0 | Status: DISCONTINUED | COMMUNITY
Start: 2021-09-27 | End: 2022-04-04

## 2022-04-04 NOTE — HISTORY OF PRESENT ILLNESS
[de-identified] : Mid back pain occ with driving. paraspinal nonradiating. \par occ dizzyness resolved. \par allergies takes loratidine\par gerd takes ppi when needed\par dm Taking dm meds as directed. checking sugars fasting run pp run. no low sugars. taking statin. utd ophtho exam. \par bp 120s/80s at home\par uri last week resolved covid neg . got booster\par mood is good.\par hl taking statin\par penile rash returned \par hm utd covid vaccine. utd colo. flu shot going for rpt colo. utd dental and optho\par drinks 2 drinks 3-4x week.

## 2022-04-04 NOTE — PHYSICAL EXAM
[No Acute Distress] : no acute distress [Well Nourished] : well nourished [Well Developed] : well developed [Well-Appearing] : well-appearing [Normal Sclera/Conjunctiva] : normal sclera/conjunctiva [PERRL] : pupils equal round and reactive to light [EOMI] : extraocular movements intact [Normal Outer Ear/Nose] : the outer ears and nose were normal in appearance [Normal Oropharynx] : the oropharynx was normal [No JVD] : no jugular venous distention [No Lymphadenopathy] : no lymphadenopathy [Supple] : supple [Thyroid Normal, No Nodules] : the thyroid was normal and there were no nodules present [No Respiratory Distress] : no respiratory distress  [No Accessory Muscle Use] : no accessory muscle use [Clear to Auscultation] : lungs were clear to auscultation bilaterally [Normal Rate] : normal rate  [Regular Rhythm] : with a regular rhythm [Normal S1, S2] : normal S1 and S2 [No Murmur] : no murmur heard [No Carotid Bruits] : no carotid bruits [No Abdominal Bruit] : a ~M bruit was not heard ~T in the abdomen [No Varicosities] : no varicosities [Pedal Pulses Present] : the pedal pulses are present [No Edema] : there was no peripheral edema [No Palpable Aorta] : no palpable aorta [No Extremity Clubbing/Cyanosis] : no extremity clubbing/cyanosis [Soft] : abdomen soft [Non Tender] : non-tender [Non-distended] : non-distended [No Masses] : no abdominal mass palpated [No HSM] : no HSM [Normal Bowel Sounds] : normal bowel sounds [Urethral Meatus] : meatus normal [Penis Abnormality] : normal uncircumcised penis [Urinary Bladder Findings] : the bladder was normal on palpation [Scrotum] : the scrotum was normal [Rectal Exam - Seminal Vesicles] : the seminal vesicles were normal [Testes Tenderness] : no tenderness of the testes [Testes Mass (___cm)] : there were no testicular masses [Normal Posterior Cervical Nodes] : no posterior cervical lymphadenopathy [Normal Anterior Cervical Nodes] : no anterior cervical lymphadenopathy [No CVA Tenderness] : no CVA  tenderness [No Spinal Tenderness] : no spinal tenderness [No Joint Swelling] : no joint swelling [Grossly Normal Strength/Tone] : grossly normal strength/tone [No Rash] : no rash [Coordination Grossly Intact] : coordination grossly intact [No Focal Deficits] : no focal deficits [Normal Gait] : normal gait [Deep Tendon Reflexes (DTR)] : deep tendon reflexes were 2+ and symmetric [Normal Affect] : the affect was normal [Normal Insight/Judgement] : insight and judgment were intact [FreeTextEntry1] : sl redness penile head

## 2022-04-04 NOTE — ASSESSMENT
[FreeTextEntry1] : Mid back pain occ cont stretching\par occ dizzyness resolved. \par allergies cont loratidine\par gerd cont ppi when needed\par dm A1C check today. restart asa w food. cont statin. utd eye , foot exam. rpt microalb today. discussed diet and exercise. \par bp at goal cont meds\par mood is good.\par hl cont statin\par penile rash likely candidal balaitis rec clotrimazole. given sglt2 discussedjanuvia likely contributing. he would like to cont med for now but if recurs will d/c\par hm utd covid vaccine and booster. utd colo. flu shot going for rpt colo. utd dental and optho\par

## 2022-04-05 LAB
BASOPHILS # BLD AUTO: 0.05 K/UL
BASOPHILS NFR BLD AUTO: 0.6 %
CREAT SPEC-SCNC: 54 MG/DL
EOSINOPHIL # BLD AUTO: 0.18 K/UL
EOSINOPHIL NFR BLD AUTO: 2 %
ESTIMATED AVERAGE GLUCOSE: 146 MG/DL
HBA1C MFR BLD HPLC: 6.7 %
HCT VFR BLD CALC: 47.7 %
HGB BLD-MCNC: 14.7 G/DL
IMM GRANULOCYTES NFR BLD AUTO: 0.2 %
LYMPHOCYTES # BLD AUTO: 2.7 K/UL
LYMPHOCYTES NFR BLD AUTO: 30.2 %
MAN DIFF?: NORMAL
MCHC RBC-ENTMCNC: 28.1 PG
MCHC RBC-ENTMCNC: 30.8 GM/DL
MCV RBC AUTO: 91.2 FL
MICROALBUMIN 24H UR DL<=1MG/L-MCNC: 1.5 MG/DL
MICROALBUMIN/CREAT 24H UR-RTO: 28 MG/G
MONOCYTES # BLD AUTO: 0.92 K/UL
MONOCYTES NFR BLD AUTO: 10.3 %
NEUTROPHILS # BLD AUTO: 5.06 K/UL
NEUTROPHILS NFR BLD AUTO: 56.7 %
PLATELET # BLD AUTO: 342 K/UL
RBC # BLD: 5.23 M/UL
RBC # FLD: 13.2 %
WBC # FLD AUTO: 8.93 K/UL

## 2022-04-06 LAB
C TRACH RRNA SPEC QL NAA+PROBE: NOT DETECTED
N GONORRHOEA RRNA SPEC QL NAA+PROBE: NOT DETECTED
SOURCE AMPLIFICATION: NORMAL

## 2022-05-21 RX ORDER — BLOOD-GLUCOSE METER
KIT MISCELLANEOUS
Qty: 1 | Refills: 0 | Status: ACTIVE | COMMUNITY
Start: 2022-05-21 | End: 1900-01-01

## 2022-06-10 ENCOUNTER — RX RENEWAL (OUTPATIENT)
Age: 52
End: 2022-06-10

## 2022-06-30 ENCOUNTER — RX RENEWAL (OUTPATIENT)
Age: 52
End: 2022-06-30

## 2022-09-12 ENCOUNTER — APPOINTMENT (OUTPATIENT)
Dept: INTERNAL MEDICINE | Facility: CLINIC | Age: 52
End: 2022-09-12

## 2022-09-12 VITALS
HEART RATE: 111 BPM | DIASTOLIC BLOOD PRESSURE: 78 MMHG | BODY MASS INDEX: 32.2 KG/M2 | SYSTOLIC BLOOD PRESSURE: 132 MMHG | OXYGEN SATURATION: 98 % | TEMPERATURE: 97.2 F | WEIGHT: 230 LBS | HEIGHT: 71 IN

## 2022-09-12 VITALS — HEART RATE: 97 BPM

## 2022-09-12 DIAGNOSIS — R21 RASH AND OTHER NONSPECIFIC SKIN ERUPTION: ICD-10-CM

## 2022-09-12 PROCEDURE — 99214 OFFICE O/P EST MOD 30 MIN: CPT | Mod: 25

## 2022-09-12 PROCEDURE — 90686 IIV4 VACC NO PRSV 0.5 ML IM: CPT

## 2022-09-12 PROCEDURE — G0008: CPT

## 2022-09-12 RX ORDER — EMPAGLIFLOZIN 10 MG/1
10 TABLET, FILM COATED ORAL
Qty: 30 | Refills: 5 | Status: DISCONTINUED | COMMUNITY
Start: 2021-04-19 | End: 2022-09-12

## 2022-09-12 NOTE — ASSESSMENT
[FreeTextEntry1] : \par gerd takes ppi when needed planning to see gi for egd/colo\par dm Taking dm meds as directed stopped jaridance . ate lots in [place] but now committeed to healthier habits  declines a1c today will check in 3m. \par bp 120s/80s at home cont meds. \par hl taking statin\par hm utd covid vaccine. utd colo. flu shot today going for rpt colo. utd dental and optho. feet feel fine. \par counseled pt on healthy drinking guidelines.

## 2022-09-12 NOTE — HISTORY OF PRESENT ILLNESS
[de-identified] : \par gerd takes ppi when needed planning to see gi for egd/colo\par dm Taking dm meds as directed stopped jaridance for recurrent penile rash. does not check sugars. ate lots in [place] but now committeed to healthier habits  \par bp 120s/80s at home\par hl taking statin\par hm utd covid vaccine. utd colo. flu shot today going for rpt colo. utd dental and optho. feet feel fine. \par had been drinking 4 per day in [place] none since home no w/d sx. \par

## 2022-10-18 ENCOUNTER — APPOINTMENT (OUTPATIENT)
Dept: GASTROENTEROLOGY | Facility: CLINIC | Age: 52
End: 2022-10-18

## 2022-10-18 VITALS
BODY MASS INDEX: 32.48 KG/M2 | DIASTOLIC BLOOD PRESSURE: 84 MMHG | HEIGHT: 71 IN | WEIGHT: 232 LBS | OXYGEN SATURATION: 98 % | HEART RATE: 75 BPM | SYSTOLIC BLOOD PRESSURE: 132 MMHG

## 2022-10-18 DIAGNOSIS — R12 HEARTBURN: ICD-10-CM

## 2022-10-18 DIAGNOSIS — Z12.11 ENCOUNTER FOR SCREENING FOR MALIGNANT NEOPLASM OF COLON: ICD-10-CM

## 2022-10-18 PROCEDURE — 99203 OFFICE O/P NEW LOW 30 MIN: CPT

## 2022-10-18 NOTE — REVIEW OF SYSTEMS
[Abdominal Pain] : no abdominal pain [Vomiting] : no vomiting [Constipation] : no constipation [Diarrhea] : no diarrhea [Heartburn] : heartburn [Melena (black stool)] : no melena [Bleeding] : no bleeding [Fecal Incontinence (soiling)] : no fecal incontinence [Bloating (gassiness)] : bloating

## 2022-10-18 NOTE — ASSESSMENT
[FreeTextEntry1] : 51-year-old [place] male complaining of abdominal bloating, heartburn and acid reflux for which she had an upper endoscopy approximately 5 to 6 years ago the results of which are not known.  The patient also had a colonoscopy at that time the results of which are not known.  We will try to obtain previous upper endoscopy and colonoscopy reports from his prior gastroenterologist on [facility] in [place].  I have advised the patient that if indeed his colonoscopy from 5 to 6 years ago was indeed normal and without colon polyps he may wait an additional 4 to 5 years before having his next colonoscopy.

## 2022-10-18 NOTE — CONSULT LETTER
[Dear  ___] : Dear  [unfilled], [Consult Letter:] : I had the pleasure of evaluating your patient, [unfilled]. [Please see my note below.] : Please see my note below. [Consult Closing:] : Thank you very much for allowing me to participate in the care of this patient.  If you have any questions, please do not hesitate to contact me. [FreeTextEntry2] : Dr. Brandee Vogel

## 2022-10-18 NOTE — HISTORY OF PRESENT ILLNESS
[de-identified] : Upper endoscopy done 5 to 6 years ago on [address] in [place] results not known [FreeTextEntry1] : Colonoscopy performed 5 to 6 years ago on [address] in [place] reportedly normal

## 2022-10-18 NOTE — PHYSICAL EXAM
[Hearing Threshold Finger Rub Not [place]] : hearing was normal [Normal] : normal bowel sounds, non-tender, no masses, soft, no no hepato-splenomegaly [de-identified] : Deferred

## 2022-12-02 LAB — SARS-COV-2 N GENE NPH QL NAA+PROBE: NOT DETECTED

## 2022-12-04 ENCOUNTER — TRANSCRIPTION ENCOUNTER (OUTPATIENT)
Age: 52
End: 2022-12-04

## 2022-12-05 ENCOUNTER — APPOINTMENT (OUTPATIENT)
Dept: GASTROENTEROLOGY | Facility: HOSPITAL | Age: 52
End: 2022-12-05

## 2022-12-05 ENCOUNTER — RESULT REVIEW (OUTPATIENT)
Age: 52
End: 2022-12-05

## 2022-12-05 ENCOUNTER — OUTPATIENT (OUTPATIENT)
Dept: OUTPATIENT SERVICES | Facility: HOSPITAL | Age: 52
LOS: 1 days | End: 2022-12-05
Payer: MEDICAID

## 2022-12-05 DIAGNOSIS — Z98.89 OTHER SPECIFIED POSTPROCEDURAL STATES: Chronic | ICD-10-CM

## 2022-12-05 DIAGNOSIS — Z12.11 ENCOUNTER FOR SCREENING FOR MALIGNANT NEOPLASM OF COLON: ICD-10-CM

## 2022-12-05 DIAGNOSIS — K21.9 GASTRO-ESOPHAGEAL REFLUX DISEASE WITHOUT ESOPHAGITIS: ICD-10-CM

## 2022-12-05 PROCEDURE — 88313 SPECIAL STAINS GROUP 2: CPT

## 2022-12-05 PROCEDURE — 88312 SPECIAL STAINS GROUP 1: CPT

## 2022-12-05 PROCEDURE — 45380 COLONOSCOPY AND BIOPSY: CPT | Mod: 59

## 2022-12-05 PROCEDURE — 43239 EGD BIOPSY SINGLE/MULTIPLE: CPT

## 2022-12-05 PROCEDURE — 82962 GLUCOSE BLOOD TEST: CPT

## 2022-12-05 PROCEDURE — 88313 SPECIAL STAINS GROUP 2: CPT | Mod: 26

## 2022-12-05 PROCEDURE — 45380 COLONOSCOPY AND BIOPSY: CPT | Mod: PT

## 2022-12-05 PROCEDURE — 88312 SPECIAL STAINS GROUP 1: CPT | Mod: 26

## 2022-12-05 PROCEDURE — 88305 TISSUE EXAM BY PATHOLOGIST: CPT

## 2022-12-05 PROCEDURE — 88305 TISSUE EXAM BY PATHOLOGIST: CPT | Mod: 26

## 2022-12-07 LAB — SURGICAL PATHOLOGY STUDY: SIGNIFICANT CHANGE UP

## 2022-12-19 ENCOUNTER — APPOINTMENT (OUTPATIENT)
Dept: INTERNAL MEDICINE | Facility: CLINIC | Age: 52
End: 2022-12-19

## 2022-12-19 VITALS
HEART RATE: 86 BPM | WEIGHT: 230 LBS | DIASTOLIC BLOOD PRESSURE: 84 MMHG | SYSTOLIC BLOOD PRESSURE: 153 MMHG | TEMPERATURE: 98.6 F | OXYGEN SATURATION: 98 % | HEIGHT: 71 IN | BODY MASS INDEX: 32.2 KG/M2

## 2022-12-19 VITALS — DIASTOLIC BLOOD PRESSURE: 78 MMHG | SYSTOLIC BLOOD PRESSURE: 125 MMHG

## 2022-12-19 DIAGNOSIS — J06.9 ACUTE UPPER RESPIRATORY INFECTION, UNSPECIFIED: ICD-10-CM

## 2022-12-19 LAB — HBA1C MFR BLD HPLC: 7.5

## 2022-12-19 PROCEDURE — 83036 HEMOGLOBIN GLYCOSYLATED A1C: CPT | Mod: QW

## 2022-12-19 PROCEDURE — 99214 OFFICE O/P EST MOD 30 MIN: CPT | Mod: 25

## 2022-12-19 NOTE — HISTORY OF PRESENT ILLNESS
[de-identified] : uri sx started 5d ago.son had flu. no fever no sob. cough, congestion, sore throat. \par gerd takes ppi when needed has gi fu \par dm Taking dm meds as directed does not check sugars. \par bp 120s/80s at home\par hl taking statin\par hm utd covid vaccine. utd colo. utd dental and optho. feet feel fine. \par

## 2022-12-19 NOTE — PHYSICAL EXAM
[No Acute Distress] : no acute distress [EOMI] : extraocular movements intact [Normal Oropharynx] : the oropharynx was normal [No Lymphadenopathy] : no lymphadenopathy [Clear to Auscultation] : lungs were clear to auscultation bilaterally [Normal S1, S2] : normal S1 and S2 [No Edema] : there was no peripheral edema [Normal Posterior Cervical Nodes] : no posterior cervical lymphadenopathy [Normal Anterior Cervical Nodes] : no anterior cervical lymphadenopathy [No Spinal Tenderness] : no spinal tenderness [Normal Mood] : the mood was normal [Comprehensive Foot Exam Normal] : Right and left foot were examined and both feet are normal. No ulcers in either foot. Toes are normal and with full ROM.  Normal tactile sensation with monofilament testing throughout both feet

## 2022-12-19 NOTE — ASSESSMENT
[FreeTextEntry1] : uri rec sx care, swab given son had flu. Call if any new or worsening symptoms. \par gerd in light of bx findings rec daily ppi pending gi appt\par dm A1C above goal rec mf bid. cont statin. utd eye , foot exam. utd microalb. discussed diet and exercise. \par bp at goal\par hl cont statin\par hm utd covid vaccine. utd colo. utd dental and optho. \par fu 3m. \par

## 2022-12-20 LAB
INFLUENZA A RESULT: NOT DETECTED
INFLUENZA B RESULT: NOT DETECTED
RESP SYN VIRUS RESULT: NOT DETECTED
SARS-COV-2 RESULT: NOT DETECTED

## 2023-01-03 ENCOUNTER — APPOINTMENT (OUTPATIENT)
Dept: GASTROENTEROLOGY | Facility: CLINIC | Age: 53
End: 2023-01-03
Payer: MEDICAID

## 2023-01-03 VITALS
OXYGEN SATURATION: 99 % | HEART RATE: 80 BPM | WEIGHT: 233 LBS | SYSTOLIC BLOOD PRESSURE: 142 MMHG | HEIGHT: 71 IN | DIASTOLIC BLOOD PRESSURE: 86 MMHG | BODY MASS INDEX: 32.62 KG/M2

## 2023-01-03 DIAGNOSIS — K64.8 OTHER HEMORRHOIDS: ICD-10-CM

## 2023-01-03 DIAGNOSIS — K21.9 DIAPHRAGMATIC HERNIA W/OUT OBSTRUCTION OR GANGRENE: ICD-10-CM

## 2023-01-03 DIAGNOSIS — K63.5 POLYP OF COLON: ICD-10-CM

## 2023-01-03 DIAGNOSIS — K21.00 GASTRO-ESOPHAGEAL REFLUX DISEASE WITH ESOPHAGITIS, WITHOUT BLEEDING: ICD-10-CM

## 2023-01-03 DIAGNOSIS — K44.9 DIAPHRAGMATIC HERNIA W/OUT OBSTRUCTION OR GANGRENE: ICD-10-CM

## 2023-01-03 DIAGNOSIS — R14.0 ABDOMINAL DISTENSION (GASEOUS): ICD-10-CM

## 2023-01-03 PROCEDURE — 99213 OFFICE O/P EST LOW 20 MIN: CPT

## 2023-01-03 RX ORDER — CYCLOBENZAPRINE HYDROCHLORIDE 5 MG/1
5 TABLET, FILM COATED ORAL
Qty: 30 | Refills: 1 | Status: DISCONTINUED | COMMUNITY
Start: 2021-06-28 | End: 2023-01-03

## 2023-01-03 RX ORDER — DESONIDE 0.5 MG/G
0.05 CREAM TOPICAL
Qty: 1 | Refills: 3 | Status: DISCONTINUED | COMMUNITY
Start: 2017-02-06 | End: 2023-01-03

## 2023-01-03 RX ORDER — NYSTATIN AND TRIAMCINOLONE ACETONIDE 100000; 1 MG/G; MG/G
100000-0.1 CREAM TOPICAL TWICE DAILY
Qty: 1 | Refills: 0 | Status: DISCONTINUED | COMMUNITY
Start: 2021-09-27 | End: 2023-01-03

## 2023-01-03 RX ORDER — NYSTATIN AND TRIAMCINOLONE ACETONIDE 100000; 1 MG/G; MG/G
100000-0.1 CREAM TOPICAL TWICE DAILY
Qty: 1 | Refills: 3 | Status: DISCONTINUED | COMMUNITY
Start: 2020-07-06 | End: 2023-01-03

## 2023-01-03 RX ORDER — POLYETHYLENE GLYCOL-3350 AND ELECTROLYTES WITH FLAVOR PACK 240; 5.84; 2.98; 6.72; 22.72 G/278.26G; G/278.26G; G/278.26G; G/278.26G; G/278.26G
240 POWDER, FOR SOLUTION ORAL
Qty: 1 | Refills: 0 | Status: DISCONTINUED | COMMUNITY
Start: 2022-10-18 | End: 2023-01-03

## 2023-01-03 RX ORDER — DICLOFENAC SODIUM 1% 10 MG/G
1 GEL TOPICAL
Qty: 100 | Refills: 3 | Status: DISCONTINUED | COMMUNITY
Start: 2021-01-25 | End: 2023-01-03

## 2023-01-03 NOTE — HISTORY OF PRESENT ILLNESS
[de-identified] : Upper endoscopy performed December 5, 2022 revealed a hiatal hernia, benign-appearing fundic gland polyps, reactive gastropathy in the antrum of the stomach, and mild chronic inflammation in the GE junction without evidence for Alex's esophagus.  Biopsies were negative for Helicobacter pylori. [FreeTextEntry1] : Colonoscopy performed December 5, 2022 revealed a hyperplastic polyp in the left descending colon and a hyperplastic polyp in the rectum; there was no evidence for diverticulosis.  Internal hemorrhoids were appreciated.

## 2023-01-03 NOTE — ASSESSMENT
[FreeTextEntry1] : 52-year-old  male complaining of abdominal bloating, heartburn, and acid reflux for which he takes omeprazole 20 mg on a as needed basis.  Symptoms are stable.  Results of upper endoscopy and colonoscopy discussed with the patient and his son.

## 2023-01-03 NOTE — REVIEW OF SYSTEMS
[Heartburn] : heartburn [Bloating (gassiness)] : bloating [Abdominal Pain] : no abdominal pain [Vomiting] : no vomiting [Constipation] : no constipation [Diarrhea] : no diarrhea [Melena (black stool)] : no melena [Fecal Incontinence (soiling)] : no fecal incontinence

## 2023-01-03 NOTE — REASON FOR VISIT
[Follow-up] : a follow-up of an existing diagnosis [Family Member] : family member [FreeTextEntry1] : 52-year-old  male presents with his son to discuss findings of his recent upper endoscopy and colonoscopy.

## 2023-01-03 NOTE — PHYSICAL EXAM
[None] : no edema [Normal] : normal bowel sounds, non-tender, no masses, soft, no no hepato-splenomegaly [de-identified] : Deferred

## 2023-01-10 ENCOUNTER — NON-APPOINTMENT (OUTPATIENT)
Age: 53
End: 2023-01-10

## 2023-01-12 ENCOUNTER — RX RENEWAL (OUTPATIENT)
Age: 53
End: 2023-01-12

## 2023-01-23 ENCOUNTER — NON-APPOINTMENT (OUTPATIENT)
Age: 53
End: 2023-01-23

## 2023-01-23 ENCOUNTER — APPOINTMENT (OUTPATIENT)
Dept: ORTHOPEDIC SURGERY | Facility: CLINIC | Age: 53
End: 2023-01-23
Payer: MEDICAID

## 2023-01-23 VITALS
WEIGHT: 233 LBS | DIASTOLIC BLOOD PRESSURE: 88 MMHG | SYSTOLIC BLOOD PRESSURE: 151 MMHG | HEIGHT: 71 IN | BODY MASS INDEX: 32.62 KG/M2

## 2023-01-23 DIAGNOSIS — M54.2 CERVICALGIA: ICD-10-CM

## 2023-01-23 PROCEDURE — 72050 X-RAY EXAM NECK SPINE 4/5VWS: CPT

## 2023-01-23 PROCEDURE — 99203 OFFICE O/P NEW LOW 30 MIN: CPT

## 2023-01-23 NOTE — PHYSICAL EXAM
[de-identified] : Cervical Physical Exam:\par \par Gait - Normal\par \par Station- Normal\par \par Sagittal Balance- Normal\par \par Compensatory mechanism - none\par \par Horizontal Gaze- Maintained\par \par Heel walk- Normal\par \par Toe walk- Normal\par \par Reflexes:\par Biceps- Normal\par Triceps- Normal\par Brachioradialis- Normal\par Patellar- Normal\par Gastroc- Normal\par Clonus- No\par \par Batista's- None\par \par Shoulder Exam - Normal\par \par Spurling's- None\par \par Wrist Pulses- 2+ radial/ulnar\par \par Cervical range of motion- significantly reduced\par \par Sensation\par C5-T1 sensation intact to light touch bilaterally\par \par L1-S1 sensation intact to light touch bilaterally\par \par Motor\par                  Right/Left\par - Deltoid     5/5\par - Biceps     5/5\par - Triceps    5/5\par - WF          5/5\par - WE          5/5\par - IO            5/5\par -         5/5\par \par - IP             5/5\par - Quad       5/5\par - HS           5/5\par - TA           5/5\par - Gastroc   5/5\par - EHL          5/5\par  [de-identified] : Xray Cervical 4 views:\par No fracture\par Anterior ossification of C4-C5 with progression to C3\par No spondylolisthesis\par No motion on flexion/extension views

## 2023-01-23 NOTE — HISTORY OF PRESENT ILLNESS
[de-identified] : 53 yo male with 10 days of neck pain. He denies any injury or trauma. He went to an urgent care and was told to take Advil 600mg and a muscle relaxer. He feels his pain has decreased though still has some discomfort. He denies any issue with balance or hand dexterity. He had a similar episode a year ago though not as intense as currently. He denies any radicular type symptoms.

## 2023-01-23 NOTE — ASSESSMENT
[FreeTextEntry1] : I had a lengthy discussion with the patient in regards to treatment plan and diagnosis. There are no red flag findings on imaging nor are there any red flag findings on clinical exam. Therefore we will proceed with a course of conservative treatment. This would include physical therapy/home exercise program, Tylenol, NSAIDs as medically indicated. The patient will follow up with me in approximately 4 to 6 weeks. I encouraged the patient to follow-up sooner if there are any new or worsening symptoms.\par

## 2023-02-23 ENCOUNTER — RX RENEWAL (OUTPATIENT)
Age: 53
End: 2023-02-23

## 2023-03-31 ENCOUNTER — RX RENEWAL (OUTPATIENT)
Age: 53
End: 2023-03-31

## 2023-04-10 NOTE — PHYSICAL EXAM
[No Acute Distress] : no acute distress [No Respiratory Distress] : no respiratory distress  [No Accessory Muscle Use] : no accessory muscle use [Regular Rhythm] : with a regular rhythm [No Edema] : there was no peripheral edema [Normal Mood] : the mood was normal [Comprehensive Foot Exam Normal] : Right and left foot were examined and both feet are normal. No ulcers in either foot. Toes are normal and with full ROM.  Normal tactile sensation with monofilament testing throughout both feet Rifampin Counseling: I discussed with the patient the risks of rifampin including but not limited to liver damage, kidney damage, red-orange body fluids, nausea/vomiting and severe allergy.

## 2023-04-17 ENCOUNTER — APPOINTMENT (OUTPATIENT)
Dept: INTERNAL MEDICINE | Facility: CLINIC | Age: 53
End: 2023-04-17
Payer: MEDICAID

## 2023-04-17 VITALS
DIASTOLIC BLOOD PRESSURE: 79 MMHG | SYSTOLIC BLOOD PRESSURE: 144 MMHG | HEART RATE: 86 BPM | OXYGEN SATURATION: 98 % | HEIGHT: 71 IN | TEMPERATURE: 98.8 F | WEIGHT: 234 LBS | BODY MASS INDEX: 32.76 KG/M2

## 2023-04-17 VITALS — SYSTOLIC BLOOD PRESSURE: 118 MMHG | DIASTOLIC BLOOD PRESSURE: 72 MMHG

## 2023-04-17 DIAGNOSIS — J30.2 OTHER SEASONAL ALLERGIC RHINITIS: ICD-10-CM

## 2023-04-17 DIAGNOSIS — E53.8 DEFICIENCY OF OTHER SPECIFIED B GROUP VITAMINS: ICD-10-CM

## 2023-04-17 PROCEDURE — 99396 PREV VISIT EST AGE 40-64: CPT | Mod: 25

## 2023-04-17 PROCEDURE — G0009: CPT

## 2023-04-17 PROCEDURE — 90670 PCV13 VACCINE IM: CPT

## 2023-04-17 RX ORDER — OMEPRAZOLE 20 MG/1
20 CAPSULE, DELAYED RELEASE ORAL
Qty: 1 | Refills: 1 | Status: DISCONTINUED | COMMUNITY
Start: 2022-09-12 | End: 2023-04-17

## 2023-04-17 RX ORDER — LANCETS 28 GAUGE
EACH MISCELLANEOUS
Qty: 1 | Refills: 3 | Status: ACTIVE | COMMUNITY
Start: 2023-04-17 | End: 1900-01-01

## 2023-04-17 RX ORDER — NAPROXEN 500 MG/1
500 TABLET ORAL
Qty: 60 | Refills: 1 | Status: ACTIVE | COMMUNITY
Start: 2018-08-02 | End: 1900-01-01

## 2023-04-17 RX ORDER — BLOOD SUGAR DIAGNOSTIC
STRIP MISCELLANEOUS
Qty: 1 | Refills: 5 | Status: ACTIVE | COMMUNITY
Start: 2023-04-17 | End: 1900-01-01

## 2023-04-17 NOTE — HISTORY OF PRESENT ILLNESS
[de-identified] : gerd takes ppi when needed  \par dm Taking dm meds as directed does not check sugars. due for microalb. has ophtho appt \par bp 120s/80s at home\par hl taking statin\par hm utd covid vaccine. utd colo. utd dental. feet feel fine. due for prevnar. psa age 53.

## 2023-04-17 NOTE — ASSESSMENT
[FreeTextEntry1] : gerd cont ppi prn utd egd/colo\par dm a1c just above gaol rec mf bid, check sugars, supplies sent. due for microalb. has ophtho appt \par bp at goal cont current meds\par hl cont statin\par hm utd covid vaccine. utd colo. utd dental. feet feel fine. due for prevnar. psa age 53.

## 2023-04-18 ENCOUNTER — RX RENEWAL (OUTPATIENT)
Age: 53
End: 2023-04-18

## 2023-04-18 LAB
ALBUMIN SERPL ELPH-MCNC: 4.4 G/DL
ALP BLD-CCNC: 105 U/L
ALT SERPL-CCNC: 38 U/L
ANION GAP SERPL CALC-SCNC: 13 MMOL/L
AST SERPL-CCNC: 28 U/L
BASOPHILS # BLD AUTO: 0.06 K/UL
BASOPHILS NFR BLD AUTO: 0.7 %
BILIRUB SERPL-MCNC: 0.2 MG/DL
BUN SERPL-MCNC: 10 MG/DL
CALCIUM SERPL-MCNC: 10.2 MG/DL
CHLORIDE SERPL-SCNC: 102 MMOL/L
CHOLEST SERPL-MCNC: 194 MG/DL
CO2 SERPL-SCNC: 24 MMOL/L
CREAT SERPL-MCNC: 1.04 MG/DL
CREAT SPEC-SCNC: 49 MG/DL
EGFR: 85 ML/MIN/1.73M2
EOSINOPHIL # BLD AUTO: 0.17 K/UL
EOSINOPHIL NFR BLD AUTO: 2 %
GLUCOSE SERPL-MCNC: 127 MG/DL
HCT VFR BLD CALC: 44.3 %
HDLC SERPL-MCNC: 33 MG/DL
HGB BLD-MCNC: 13.9 G/DL
IMM GRANULOCYTES NFR BLD AUTO: 0.2 %
LDLC SERPL CALC-MCNC: 115 MG/DL
LYMPHOCYTES # BLD AUTO: 3.31 K/UL
LYMPHOCYTES NFR BLD AUTO: 39.4 %
MAN DIFF?: NORMAL
MCHC RBC-ENTMCNC: 29.4 PG
MCHC RBC-ENTMCNC: 31.4 GM/DL
MCV RBC AUTO: 93.9 FL
MICROALBUMIN 24H UR DL<=1MG/L-MCNC: <1.2 MG/DL
MICROALBUMIN/CREAT 24H UR-RTO: NORMAL MG/G
MONOCYTES # BLD AUTO: 0.8 K/UL
MONOCYTES NFR BLD AUTO: 9.5 %
NEUTROPHILS # BLD AUTO: 4.05 K/UL
NEUTROPHILS NFR BLD AUTO: 48.2 %
NONHDLC SERPL-MCNC: 162 MG/DL
PLATELET # BLD AUTO: 341 K/UL
POTASSIUM SERPL-SCNC: 4.9 MMOL/L
PROT SERPL-MCNC: 7.1 G/DL
RBC # BLD: 4.72 M/UL
RBC # FLD: 13.1 %
SODIUM SERPL-SCNC: 139 MMOL/L
TRIGL SERPL-MCNC: 235 MG/DL
TSH SERPL-ACNC: 3.12 UIU/ML
VIT B12 SERPL-MCNC: 712 PG/ML
WBC # FLD AUTO: 8.41 K/UL

## 2023-04-25 ENCOUNTER — NON-APPOINTMENT (OUTPATIENT)
Age: 53
End: 2023-04-25

## 2023-04-28 ENCOUNTER — APPOINTMENT (OUTPATIENT)
Dept: INTERNAL MEDICINE | Facility: CLINIC | Age: 53
End: 2023-04-28
Payer: MEDICAID

## 2023-04-28 ENCOUNTER — NON-APPOINTMENT (OUTPATIENT)
Age: 53
End: 2023-04-28

## 2023-04-28 VITALS
DIASTOLIC BLOOD PRESSURE: 79 MMHG | SYSTOLIC BLOOD PRESSURE: 128 MMHG | BODY MASS INDEX: 31.36 KG/M2 | OXYGEN SATURATION: 98 % | HEIGHT: 71 IN | WEIGHT: 224 LBS | TEMPERATURE: 98.2 F | HEART RATE: 78 BPM

## 2023-04-28 DIAGNOSIS — K21.9 GASTRO-ESOPHAGEAL REFLUX DISEASE W/OUT ESOPHAGITIS: ICD-10-CM

## 2023-04-28 PROCEDURE — 93000 ELECTROCARDIOGRAM COMPLETE: CPT

## 2023-04-28 PROCEDURE — 99214 OFFICE O/P EST MOD 30 MIN: CPT | Mod: 25

## 2023-04-28 RX ORDER — POLYVINYL ALCOHOL, POVIDONE 14; 6 MG/ML; MG/ML
1.4-0.6 SOLUTION/ DROPS OPHTHALMIC
Refills: 0 | Status: ACTIVE | COMMUNITY
Start: 2023-04-28

## 2023-04-28 RX ORDER — SIMVASTATIN 20 MG/1
20 TABLET, FILM COATED ORAL
Qty: 90 | Refills: 3 | Status: DISCONTINUED | COMMUNITY
Start: 2021-06-13 | End: 2023-04-28

## 2023-04-28 RX ORDER — CLOTRIMAZOLE 10 MG/G
1 CREAM TOPICAL 3 TIMES DAILY
Qty: 30 | Refills: 3 | Status: DISCONTINUED | COMMUNITY
Start: 2022-04-04 | End: 2023-04-28

## 2023-04-28 RX ORDER — ASPIRIN 81 MG/1
81 TABLET ORAL DAILY
Qty: 90 | Refills: 1 | Status: ACTIVE | COMMUNITY
Start: 2023-04-28 | End: 1900-01-01

## 2023-04-28 NOTE — ASSESSMENT
[FreeTextEntry1] : Suspect gerd causing sx given relief w belching, gasx.\par given several risk fx and exertional relationship rec stress test\par ekg and exam today normal and at baseline\par restart omeprazole for gerd\par \par cont htn, hl, dm meds. \par to ed if any rest sx or worsening sx.

## 2023-04-28 NOTE — HISTORY OF PRESENT ILLNESS
[FreeTextEntry8] : Here for 10d of occ chest pain\par has dm, htn, hl\par Walking 2 miles feels fine during\par Comes home feels ok.\par Then goes back out and feels chest discomfort.\par a 'slow' pain nonradiating. wife notices him catching his breath. no sob, edema, dizzyness, nausea. lasts 5 min. \par does have upset stomach, gas when this happens. once took gasx and pain resolved.\par has not been taking omeprazole. \par recent egd showed gastritis.

## 2023-05-25 ENCOUNTER — RX RENEWAL (OUTPATIENT)
Age: 53
End: 2023-05-25

## 2023-06-05 ENCOUNTER — APPOINTMENT (OUTPATIENT)
Dept: CV DIAGNOSTICS | Facility: HOSPITAL | Age: 53
End: 2023-06-05

## 2023-06-05 ENCOUNTER — OUTPATIENT (OUTPATIENT)
Dept: OUTPATIENT SERVICES | Facility: HOSPITAL | Age: 53
LOS: 1 days | End: 2023-06-05
Payer: MEDICAID

## 2023-06-05 DIAGNOSIS — R07.9 CHEST PAIN, UNSPECIFIED: ICD-10-CM

## 2023-06-05 DIAGNOSIS — Z98.89 OTHER SPECIFIED POSTPROCEDURAL STATES: Chronic | ICD-10-CM

## 2023-06-05 PROCEDURE — 93018 CV STRESS TEST I&R ONLY: CPT

## 2023-06-05 PROCEDURE — 93017 CV STRESS TEST TRACING ONLY: CPT

## 2023-06-05 PROCEDURE — 93016 CV STRESS TEST SUPVJ ONLY: CPT

## 2023-08-08 ENCOUNTER — RX RENEWAL (OUTPATIENT)
Age: 53
End: 2023-08-08

## 2023-08-18 ENCOUNTER — NON-APPOINTMENT (OUTPATIENT)
Age: 53
End: 2023-08-18

## 2023-08-18 ENCOUNTER — APPOINTMENT (OUTPATIENT)
Dept: INTERNAL MEDICINE | Facility: CLINIC | Age: 53
End: 2023-08-18
Payer: MEDICAID

## 2023-08-18 VITALS
OXYGEN SATURATION: 98 % | SYSTOLIC BLOOD PRESSURE: 115 MMHG | HEART RATE: 83 BPM | HEIGHT: 71 IN | RESPIRATION RATE: 15 BRPM | BODY MASS INDEX: 32.2 KG/M2 | DIASTOLIC BLOOD PRESSURE: 67 MMHG | TEMPERATURE: 98 F | WEIGHT: 230 LBS

## 2023-08-18 LAB — HBA1C MFR BLD HPLC: 8.8

## 2023-08-18 PROCEDURE — 83036 HEMOGLOBIN GLYCOSYLATED A1C: CPT | Mod: QW

## 2023-08-18 PROCEDURE — 99214 OFFICE O/P EST MOD 30 MIN: CPT | Mod: 25

## 2023-08-18 NOTE — HISTORY OF PRESENT ILLNESS
[de-identified] : 1. No logner having cp. stress test duke score 4.  2. dm not checking sugars. feet feel fine. utd ophtho. not walking. takes mf once a day did not tolerate jardiance. would like to try oral options only.   3. notes feeling 'shaking' on the inside, no shaking on his body, when working for a long time outside. does not check sugars. sometimes feels tired by evening. sleeps well. no sob or cp when working. does snore. moods ok. no joint pains.   4. htn takes meds as directed not checking bp at home.  5. hm utd colo vaccines ophtho dental.

## 2023-08-18 NOTE — ASSESSMENT
[FreeTextEntry1] : 1. No logner having cp. stress test duke score 4. rec fu cardiologist  2. dm above goal rec start glp1 he will try rybelsus as oral option. cont statin. utd microalb. feet look fine.  3. given fatigue, obesity, htn rec sleep study. check labs. discused sleep hygiene, fu caridologist, check labs today. exam unremarkable. check sugar during episodes  4. htn takes meds as directed not checking bp at home.  5. hm utd colo vaccines ophtho dental. psa at age 53.

## 2023-08-20 ENCOUNTER — NON-APPOINTMENT (OUTPATIENT)
Age: 53
End: 2023-08-20

## 2023-08-21 LAB
ALBUMIN SERPL ELPH-MCNC: 4.2 G/DL
ALP BLD-CCNC: 118 U/L
ALT SERPL-CCNC: 46 U/L
ANION GAP SERPL CALC-SCNC: 13 MMOL/L
AST SERPL-CCNC: 36 U/L
BILIRUB SERPL-MCNC: 0.3 MG/DL
BUN SERPL-MCNC: 8 MG/DL
CALCIUM SERPL-MCNC: 9.9 MG/DL
CHLORIDE SERPL-SCNC: 99 MMOL/L
CO2 SERPL-SCNC: 23 MMOL/L
CREAT SERPL-MCNC: 0.88 MG/DL
EGFR: 102 ML/MIN/1.73M2
ERYTHROCYTE [SEDIMENTATION RATE] IN BLOOD BY WESTERGREN METHOD: 43 MM/HR
GLUCOSE SERPL-MCNC: 273 MG/DL
POTASSIUM SERPL-SCNC: 4.5 MMOL/L
PROT SERPL-MCNC: 6.9 G/DL
SODIUM SERPL-SCNC: 135 MMOL/L
TSH SERPL-ACNC: 3.04 UIU/ML

## 2023-08-22 ENCOUNTER — APPOINTMENT (OUTPATIENT)
Dept: ULTRASOUND IMAGING | Facility: CLINIC | Age: 53
End: 2023-08-22
Payer: MEDICAID

## 2023-08-22 LAB
HBV SURFACE AB SER QL: NONREACTIVE
HBV SURFACE AG SER QL: NONREACTIVE
HCV AB SER QL: NONREACTIVE
HCV S/CO RATIO: 0.16 S/CO

## 2023-08-22 PROCEDURE — 76700 US EXAM ABDOM COMPLETE: CPT

## 2023-08-23 ENCOUNTER — RX RENEWAL (OUTPATIENT)
Age: 53
End: 2023-08-23

## 2023-08-27 ENCOUNTER — NON-APPOINTMENT (OUTPATIENT)
Age: 53
End: 2023-08-27

## 2023-08-28 ENCOUNTER — NON-APPOINTMENT (OUTPATIENT)
Age: 53
End: 2023-08-28

## 2023-08-28 ENCOUNTER — APPOINTMENT (OUTPATIENT)
Dept: CARDIOLOGY | Facility: CLINIC | Age: 53
End: 2023-08-28
Payer: MEDICAID

## 2023-08-28 VITALS
BODY MASS INDEX: 31.64 KG/M2 | WEIGHT: 226 LBS | SYSTOLIC BLOOD PRESSURE: 128 MMHG | HEART RATE: 70 BPM | HEIGHT: 71 IN | OXYGEN SATURATION: 95 % | DIASTOLIC BLOOD PRESSURE: 81 MMHG

## 2023-08-28 DIAGNOSIS — R07.9 CHEST PAIN, UNSPECIFIED: ICD-10-CM

## 2023-08-28 PROCEDURE — 99205 OFFICE O/P NEW HI 60 MIN: CPT | Mod: 25

## 2023-08-28 PROCEDURE — 93000 ELECTROCARDIOGRAM COMPLETE: CPT

## 2023-09-19 ENCOUNTER — APPOINTMENT (OUTPATIENT)
Dept: CT IMAGING | Facility: CLINIC | Age: 53
End: 2023-09-19
Payer: MEDICAID

## 2023-09-19 PROCEDURE — 75574 CT ANGIO HRT W/3D IMAGE: CPT

## 2023-09-25 ENCOUNTER — APPOINTMENT (OUTPATIENT)
Dept: CARDIOLOGY | Facility: CLINIC | Age: 53
End: 2023-09-25
Payer: MEDICAID

## 2023-09-25 PROCEDURE — 93306 TTE W/DOPPLER COMPLETE: CPT

## 2023-10-23 ENCOUNTER — APPOINTMENT (OUTPATIENT)
Dept: OTOLARYNGOLOGY | Facility: CLINIC | Age: 53
End: 2023-10-23
Payer: MEDICAID

## 2023-10-23 VITALS
SYSTOLIC BLOOD PRESSURE: 127 MMHG | DIASTOLIC BLOOD PRESSURE: 81 MMHG | WEIGHT: 230 LBS | HEIGHT: 71 IN | HEART RATE: 76 BPM | BODY MASS INDEX: 32.2 KG/M2

## 2023-10-23 DIAGNOSIS — H90.3 SENSORINEURAL HEARING LOSS, BILATERAL: ICD-10-CM

## 2023-10-23 DIAGNOSIS — H60.63 UNSPECIFIED CHRONIC OTITIS EXTERNA, BILATERAL: ICD-10-CM

## 2023-10-23 PROCEDURE — 92557 COMPREHENSIVE HEARING TEST: CPT

## 2023-10-23 PROCEDURE — 99213 OFFICE O/P EST LOW 20 MIN: CPT | Mod: 25

## 2023-10-23 PROCEDURE — 92567 TYMPANOMETRY: CPT

## 2023-10-23 RX ORDER — MOMETASONE FUROATE 1 MG/G
0.1 CREAM TOPICAL TWICE DAILY
Qty: 1 | Refills: 3 | Status: ACTIVE | COMMUNITY
Start: 2023-10-23 | End: 1900-01-01

## 2023-11-06 ENCOUNTER — APPOINTMENT (OUTPATIENT)
Dept: INTERNAL MEDICINE | Facility: CLINIC | Age: 53
End: 2023-11-06
Payer: MEDICAID

## 2023-11-06 DIAGNOSIS — Z71.84 ENC FOR HEALTH COUNSELING RELATED TO TRAVEL: ICD-10-CM

## 2023-11-06 PROCEDURE — 99441: CPT

## 2023-11-06 RX ORDER — SALMONELLA TYPHI TY21A 6000000000 [CFU]/1
CAPSULE, COATED ORAL
Qty: 1 | Refills: 0 | Status: ACTIVE | COMMUNITY
Start: 2023-11-06 | End: 1900-01-01

## 2023-11-19 ENCOUNTER — RX RENEWAL (OUTPATIENT)
Age: 53
End: 2023-11-19

## 2024-01-16 ENCOUNTER — RX RENEWAL (OUTPATIENT)
Age: 54
End: 2024-01-16

## 2024-01-16 RX ORDER — LORATADINE 10 MG/1
10 TABLET ORAL DAILY
Qty: 90 | Refills: 3 | Status: ACTIVE | COMMUNITY
Start: 2023-04-17 | End: 1900-01-01

## 2024-01-16 RX ORDER — ROSUVASTATIN CALCIUM 20 MG/1
20 TABLET, FILM COATED ORAL
Qty: 90 | Refills: 3 | Status: ACTIVE | COMMUNITY
Start: 2023-04-28 | End: 1900-01-01

## 2024-01-26 ENCOUNTER — APPOINTMENT (OUTPATIENT)
Dept: INTERNAL MEDICINE | Facility: CLINIC | Age: 54
End: 2024-01-26
Payer: MEDICAID

## 2024-01-26 VITALS
SYSTOLIC BLOOD PRESSURE: 119 MMHG | HEIGHT: 71 IN | OXYGEN SATURATION: 97 % | HEART RATE: 79 BPM | WEIGHT: 225 LBS | DIASTOLIC BLOOD PRESSURE: 79 MMHG | TEMPERATURE: 97.9 F | BODY MASS INDEX: 31.5 KG/M2

## 2024-01-26 DIAGNOSIS — I10 ESSENTIAL (PRIMARY) HYPERTENSION: ICD-10-CM

## 2024-01-26 PROCEDURE — 90746 HEPB VACCINE 3 DOSE ADULT IM: CPT

## 2024-01-26 PROCEDURE — G0010: CPT

## 2024-01-26 PROCEDURE — 99214 OFFICE O/P EST MOD 30 MIN: CPT | Mod: 25

## 2024-01-26 RX ORDER — MEFLOQUINE HYDROCHLORIDE 250 MG/1
250 TABLET ORAL
Qty: 10 | Refills: 0 | Status: DISCONTINUED | COMMUNITY
Start: 2018-10-15 | End: 2024-01-26

## 2024-01-26 NOTE — PHYSICAL EXAM
[No Acute Distress] : no acute distress [Well Nourished] : well nourished [Well Developed] : well developed [Well-Appearing] : well-appearing [Normal Sclera/Conjunctiva] : normal sclera/conjunctiva [EOMI] : extraocular movements intact [PERRL] : pupils equal round and reactive to light [Normal Oropharynx] : the oropharynx was normal [Normal Outer Ear/Nose] : the outer ears and nose were normal in appearance [No Lymphadenopathy] : no lymphadenopathy [No JVD] : no jugular venous distention [Thyroid Normal, No Nodules] : the thyroid was normal and there were no nodules present [Supple] : supple [No Respiratory Distress] : no respiratory distress  [No Accessory Muscle Use] : no accessory muscle use [Clear to Auscultation] : lungs were clear to auscultation bilaterally [Normal Rate] : normal rate  [Regular Rhythm] : with a regular rhythm [Normal S1, S2] : normal S1 and S2 [No Murmur] : no murmur heard [No Carotid Bruits] : no carotid bruits [No Abdominal Bruit] : a ~M bruit was not heard ~T in the abdomen [No Varicosities] : no varicosities [Pedal Pulses Present] : the pedal pulses are present [No Edema] : there was no peripheral edema [No Palpable Aorta] : no palpable aorta [Soft] : abdomen soft [No Extremity Clubbing/Cyanosis] : no extremity clubbing/cyanosis [Non Tender] : non-tender [Non-distended] : non-distended [No Masses] : no abdominal mass palpated [No HSM] : no HSM [Normal Bowel Sounds] : normal bowel sounds [Normal Posterior Cervical Nodes] : no posterior cervical lymphadenopathy [Normal Anterior Cervical Nodes] : no anterior cervical lymphadenopathy [No CVA Tenderness] : no CVA  tenderness [No Spinal Tenderness] : no spinal tenderness [No Joint Swelling] : no joint swelling [Grossly Normal Strength/Tone] : grossly normal strength/tone [No Rash] : no rash [Coordination Grossly Intact] : coordination grossly intact [No Focal Deficits] : no focal deficits [Normal Gait] : normal gait [Deep Tendon Reflexes (DTR)] : deep tendon reflexes were 2+ and symmetric [Normal Affect] : the affect was normal [Normal Insight/Judgement] : insight and judgment were intact [Comprehensive Foot Exam Normal] : Right and left foot were examined and both feet are normal. No ulcers in either foot. Toes are normal and with full ROM.  Normal tactile sensation with monofilament testing throughout both feet

## 2024-01-26 NOTE — HISTORY OF PRESENT ILLNESS
[de-identified] : Here for f/u DM does not check. once was 89. tolerating meds. went to [facility] ate, did not exercise. planning to start. utd ophtho feet feel fine taking statin. notes easily waking up at night, feels tired in the mornign. snores. amenable to psa today. utd colo. hep b due.  did stress test, echo, nothing acute. no exertional sx.  htn taking meds as directed.

## 2024-01-26 NOTE — ASSESSMENT
[FreeTextEntry1] :  DM A1C check today  cont statin. utd eye , foot exam. utd microalb. discussed diet and exercise.  discussed sleep hyginee, humidifier, and rec home sleep study given snoring, htn.  amenable to psa today. utd colo. hep b due.  utd cardiac tesitng.  htn taking meds as directed.

## 2024-01-26 NOTE — HEALTH RISK ASSESSMENT
[0] : 2) Feeling down, depressed, or hopeless: Not at all (0) [PHQ-2 Negative - No further assessment needed] : PHQ-2 Negative - No further assessment needed [ELH1Uqtue] : 0 [Never] : Never

## 2024-01-29 LAB
ESTIMATED AVERAGE GLUCOSE: 186 MG/DL
HBA1C MFR BLD HPLC: 8.1 %
PSA SERPL-MCNC: 0.57 NG/ML

## 2024-04-11 ENCOUNTER — RX RENEWAL (OUTPATIENT)
Age: 54
End: 2024-04-11

## 2024-04-26 RX ORDER — BLOOD-GLUCOSE METER
W/DEVICE EACH MISCELLANEOUS
Qty: 1 | Refills: 0 | Status: ACTIVE | COMMUNITY
Start: 2024-04-26 | End: 1900-01-01

## 2024-05-01 ENCOUNTER — NON-APPOINTMENT (OUTPATIENT)
Age: 54
End: 2024-05-01

## 2024-05-02 ENCOUNTER — NON-APPOINTMENT (OUTPATIENT)
Age: 54
End: 2024-05-02

## 2024-05-03 ENCOUNTER — EMERGENCY (EMERGENCY)
Facility: HOSPITAL | Age: 54
LOS: 1 days | Discharge: HOME | End: 2024-05-03
Attending: STUDENT IN AN ORGANIZED HEALTH CARE EDUCATION/TRAINING PROGRAM | Admitting: STUDENT IN AN ORGANIZED HEALTH CARE EDUCATION/TRAINING PROGRAM
Payer: MEDICAID

## 2024-05-03 VITALS
HEART RATE: 67 BPM | TEMPERATURE: 98 F | RESPIRATION RATE: 18 BRPM | DIASTOLIC BLOOD PRESSURE: 73 MMHG | OXYGEN SATURATION: 96 % | SYSTOLIC BLOOD PRESSURE: 130 MMHG

## 2024-05-03 VITALS
WEIGHT: 229.94 LBS | DIASTOLIC BLOOD PRESSURE: 88 MMHG | HEART RATE: 73 BPM | TEMPERATURE: 97 F | RESPIRATION RATE: 20 BRPM | SYSTOLIC BLOOD PRESSURE: 152 MMHG | OXYGEN SATURATION: 99 %

## 2024-05-03 DIAGNOSIS — Z98.89 OTHER SPECIFIED POSTPROCEDURAL STATES: Chronic | ICD-10-CM

## 2024-05-03 LAB
ALBUMIN SERPL ELPH-MCNC: 3 G/DL — LOW (ref 3.3–5)
ALP SERPL-CCNC: 90 U/L — SIGNIFICANT CHANGE UP (ref 40–120)
ALT FLD-CCNC: 46 U/L — SIGNIFICANT CHANGE UP (ref 12–78)
ANION GAP SERPL CALC-SCNC: 5 MMOL/L — SIGNIFICANT CHANGE UP (ref 5–17)
AST SERPL-CCNC: 33 U/L — SIGNIFICANT CHANGE UP (ref 15–37)
BASOPHILS # BLD AUTO: 0.04 K/UL — SIGNIFICANT CHANGE UP (ref 0–0.2)
BASOPHILS NFR BLD AUTO: 0.5 % — SIGNIFICANT CHANGE UP (ref 0–2)
BILIRUB SERPL-MCNC: 0.3 MG/DL — SIGNIFICANT CHANGE UP (ref 0.2–1.2)
BUN SERPL-MCNC: 7 MG/DL — SIGNIFICANT CHANGE UP (ref 7–23)
CALCIUM SERPL-MCNC: 8.8 MG/DL — SIGNIFICANT CHANGE UP (ref 8.5–10.1)
CHLORIDE SERPL-SCNC: 106 MMOL/L — SIGNIFICANT CHANGE UP (ref 96–108)
CO2 SERPL-SCNC: 24 MMOL/L — SIGNIFICANT CHANGE UP (ref 22–31)
CREAT SERPL-MCNC: 0.9 MG/DL — SIGNIFICANT CHANGE UP (ref 0.5–1.3)
EGFR: 101 ML/MIN/1.73M2 — SIGNIFICANT CHANGE UP
EOSINOPHIL # BLD AUTO: 0.17 K/UL — SIGNIFICANT CHANGE UP (ref 0–0.5)
EOSINOPHIL NFR BLD AUTO: 2 % — SIGNIFICANT CHANGE UP (ref 0–6)
GLUCOSE SERPL-MCNC: 123 MG/DL — HIGH (ref 70–99)
HCT VFR BLD CALC: 42.1 % — SIGNIFICANT CHANGE UP (ref 39–50)
HGB BLD-MCNC: 13.5 G/DL — SIGNIFICANT CHANGE UP (ref 13–17)
IMM GRANULOCYTES NFR BLD AUTO: 0.2 % — SIGNIFICANT CHANGE UP (ref 0–0.9)
LIDOCAIN IGE QN: 42 U/L — SIGNIFICANT CHANGE UP (ref 13–75)
LYMPHOCYTES # BLD AUTO: 3.31 K/UL — HIGH (ref 1–3.3)
LYMPHOCYTES # BLD AUTO: 38.5 % — SIGNIFICANT CHANGE UP (ref 13–44)
MCHC RBC-ENTMCNC: 28.5 PG — SIGNIFICANT CHANGE UP (ref 27–34)
MCHC RBC-ENTMCNC: 32.1 GM/DL — SIGNIFICANT CHANGE UP (ref 32–36)
MCV RBC AUTO: 88.8 FL — SIGNIFICANT CHANGE UP (ref 80–100)
MONOCYTES # BLD AUTO: 0.83 K/UL — SIGNIFICANT CHANGE UP (ref 0–0.9)
MONOCYTES NFR BLD AUTO: 9.7 % — SIGNIFICANT CHANGE UP (ref 2–14)
NEUTROPHILS # BLD AUTO: 4.22 K/UL — SIGNIFICANT CHANGE UP (ref 1.8–7.4)
NEUTROPHILS NFR BLD AUTO: 49.1 % — SIGNIFICANT CHANGE UP (ref 43–77)
NRBC # BLD: 0 /100 WBCS — SIGNIFICANT CHANGE UP (ref 0–0)
PLATELET # BLD AUTO: 261 K/UL — SIGNIFICANT CHANGE UP (ref 150–400)
POTASSIUM SERPL-MCNC: 4.1 MMOL/L — SIGNIFICANT CHANGE UP (ref 3.5–5.3)
POTASSIUM SERPL-SCNC: 4.1 MMOL/L — SIGNIFICANT CHANGE UP (ref 3.5–5.3)
PROT SERPL-MCNC: 6.8 G/DL — SIGNIFICANT CHANGE UP (ref 6–8.3)
RBC # BLD: 4.74 M/UL — SIGNIFICANT CHANGE UP (ref 4.2–5.8)
RBC # FLD: 13.1 % — SIGNIFICANT CHANGE UP (ref 10.3–14.5)
SODIUM SERPL-SCNC: 135 MMOL/L — SIGNIFICANT CHANGE UP (ref 135–145)
WBC # BLD: 8.59 K/UL — SIGNIFICANT CHANGE UP (ref 3.8–10.5)
WBC # FLD AUTO: 8.59 K/UL — SIGNIFICANT CHANGE UP (ref 3.8–10.5)

## 2024-05-03 PROCEDURE — 36415 COLL VENOUS BLD VENIPUNCTURE: CPT

## 2024-05-03 PROCEDURE — 85025 COMPLETE CBC W/AUTO DIFF WBC: CPT

## 2024-05-03 PROCEDURE — 96375 TX/PRO/DX INJ NEW DRUG ADDON: CPT

## 2024-05-03 PROCEDURE — 99284 EMERGENCY DEPT VISIT MOD MDM: CPT | Mod: 25

## 2024-05-03 PROCEDURE — 96374 THER/PROPH/DIAG INJ IV PUSH: CPT | Mod: XU

## 2024-05-03 PROCEDURE — 74177 CT ABD & PELVIS W/CONTRAST: CPT | Mod: 26,MC

## 2024-05-03 PROCEDURE — 83690 ASSAY OF LIPASE: CPT

## 2024-05-03 PROCEDURE — 74177 CT ABD & PELVIS W/CONTRAST: CPT | Mod: MC

## 2024-05-03 PROCEDURE — 99285 EMERGENCY DEPT VISIT HI MDM: CPT

## 2024-05-03 PROCEDURE — 80053 COMPREHEN METABOLIC PANEL: CPT

## 2024-05-03 RX ORDER — SODIUM CHLORIDE 9 MG/ML
1000 INJECTION INTRAMUSCULAR; INTRAVENOUS; SUBCUTANEOUS ONCE
Refills: 0 | Status: COMPLETED | OUTPATIENT
Start: 2024-05-03 | End: 2024-05-03

## 2024-05-03 RX ORDER — FAMOTIDINE 10 MG/ML
20 INJECTION INTRAVENOUS ONCE
Refills: 0 | Status: COMPLETED | OUTPATIENT
Start: 2024-05-03 | End: 2024-05-03

## 2024-05-03 RX ORDER — ACETAMINOPHEN 500 MG
1000 TABLET ORAL ONCE
Refills: 0 | Status: COMPLETED | OUTPATIENT
Start: 2024-05-03 | End: 2024-05-03

## 2024-05-03 RX ORDER — FAMOTIDINE 10 MG/ML
1 INJECTION INTRAVENOUS
Qty: 7 | Refills: 0
Start: 2024-05-03 | End: 2024-05-09

## 2024-05-03 RX ADMIN — SODIUM CHLORIDE 1000 MILLILITER(S): 9 INJECTION INTRAMUSCULAR; INTRAVENOUS; SUBCUTANEOUS at 16:54

## 2024-05-03 RX ADMIN — Medication 400 MILLIGRAM(S): at 16:55

## 2024-05-03 RX ADMIN — FAMOTIDINE 20 MILLIGRAM(S): 10 INJECTION INTRAVENOUS at 16:55

## 2024-05-03 RX ADMIN — Medication 1000 MILLIGRAM(S): at 17:10

## 2024-05-03 NOTE — ED ADULT NURSE NOTE - NSFALLRISKINTERV_ED_ALL_ED

## 2024-05-03 NOTE — ED PROVIDER NOTE - ATTENDING APP SHARED VISIT CONTRIBUTION OF CARE
This was a shared visit with TRACE. I reviewed and verified the documentation and independently performed the documented MDM.

## 2024-05-03 NOTE — ED ADULT NURSE NOTE - BOWEL SOUNDS LLQ
Notified patient of abnormal thyroid results. Consulted with Dr. Otto and advised reducing dose of Synthroid to 75 mcg and re-checking in 4-6 weeks, discussed recommendation with patient. Encouraged to schedule appointment with endocrinologist.  Also discussed + GBS in urine and treatment now and in labor. Answered all patient's questions, pt verbalized understanding and agreement of plan. Rx sent to pharmacy. -COLE Sutton CNM     present

## 2024-05-03 NOTE — ED PROVIDER NOTE - CLINICAL SUMMARY MEDICAL DECISION MAKING FREE TEXT BOX
Here with abdominal pain and constipation.  Abdominal tenderness on exam.  Differential inclusive of colitis, diverticulitis, constipation, small bowel obstruction less likely.  Check labs, treat symptoms, CT scan, reevaluate.

## 2024-05-03 NOTE — ED PROVIDER NOTE - CARE PROVIDER_API CALL
Renny Ly  Gastroenterology  [address] [place] [id number]  Phone: [phone]  Fax: [phone]  Follow Up Time:

## 2024-05-03 NOTE — ED PROVIDER NOTE - PATIENT PORTAL LINK FT
You can access the FollowMyHealth Patient Portal offered by [facility] by registering at the following website: [email or web address]. By joining [employer]’s FollowMyHealth portal, you will also be able to view your health information using other applications (apps) compatible with our system.

## 2024-05-03 NOTE — ED PROVIDER NOTE - OBJECTIVE STATEMENT
Pt is a 53-year-old male with past medical diabetes hypertension hyperlipidemia coming in complaining of abdominal pain for about 10 days.  Patient denies any nausea vomiting fever chills abdominal surgical history.  Patient complaining of constipation went to urgent care yesterday advised to come to ED.  Patient went to pharmacy and took castor oil did have 1 bowel movement earlier this morning denies any blood in the stool.  Patient denies any chest pain shortness of breath.

## 2024-05-03 NOTE — ED ADULT TRIAGE NOTE - NS ED TRIAGE AVPU SCALE
Covid swab collected.       Alert-The patient is alert, awake and responds to voice. The patient is oriented to time, place, and person. The triage nurse is able to obtain subjective information.

## 2024-05-03 NOTE — ED ADULT NURSE NOTE - OBJECTIVE STATEMENT
Pt received in in 8A 53y male AXO 4 from home c/o abd pain. PMH GERD, DM, HTN, HLD. Pt states for 2 weeks pt has had intermittent abd pain and yesterday it began to become excruciating and patient came here today after being told to come to the emergency room from urgent care. Upon assessment pt is c/o of abd pain and states he has been constipated; no nausea, vomiting, diarrhea.

## 2024-05-03 NOTE — ED PROVIDER NOTE - NSFOLLOWUPINSTRUCTIONS_ED_ALL_ED_FT
Follow up with GI  return to ER for any worsening symptoms     Abdominal Pain    WHAT YOU NEED TO KNOW:    What do I need to know about abdominal pain? Abdominal pain may be felt anywhere between the bottom of your rib cage and your groin. Acute pain usually lasts less than 3 months. Chronic pain lasts longer than 3 months. Your pain may be sharp or dull. The pain may stay in the same place or move around. You may have the pain all the time, or it may come and go. Depending on the cause, you may also have nausea, vomiting, fever, or diarrhea.  Abdominal Organs    What causes abdominal pain? The cause may not be found. The following are common causes:    Overeating, gas pains, or food poisoning    Constipation or diarrhea    An injury    Appendicitis, a hernia, or an ulcer    Infection or a blockage    A liver, gallbladder, or kidney condition  How is the cause of abdominal pain diagnosed? Your healthcare provider will check your abdomen. He or she will ask where your pain is and when it started. Tell him or her if the pain wakes you or stops you from doing your daily activities. Describe anything that makes the pain better or worse. You may also need any of the following:    Blood, urine, or bowel movement samples may be tested for signs of an infection, disease, or injury.    X-ray pictures of your abdomen may show an injury or other cause of the pain.  How is abdominal pain treated?    Prescription pain medicine may be given. Ask your healthcare provider how to take this medicine safely. Some prescription pain medicines contain acetaminophen. Do not take other medicines that contain acetaminophen without talking to your healthcare provider. Too much acetaminophen may cause liver damage. Prescription pain medicine may cause constipation. Ask your healthcare provider how to prevent or treat constipation.    Medicines may be given to calm your stomach or prevent vomiting.    Relaxation therapy may be used along with pain medicine.    Surgery may be needed, depending on the cause.  What can I do to manage or prevent abdominal pain?    Apply heat on your abdomen for 20 to 30 minutes every 2 hours for as many days as directed. Heat helps decrease pain and muscle spasms.    Make changes to the foods you eat, if needed. Do not eat foods that cause abdominal pain or other symptoms. Eat small meals more often. The following changes may also help:  Eat more high-fiber foods if you are constipated. High-fiber foods include fruits, vegetables, whole-grain foods, and legumes such as wong beans.        Do not eat foods that cause gas if you have bloating. Examples include broccoli, cabbage, beans, and carbonated drinks.    Do not eat foods or drinks that contain sorbitol or fructose if you have diarrhea and bloating. Some examples are fruit juices, candy, jelly, and sugar-free gum.    Do not eat high-fat foods. Examples include fried foods, cheeseburgers, hot dogs, and desserts.    Make changes to the liquids you drink, if needed. Do not drink liquids that cause pain or make it worse, such as orange juice. Drink liquids throughout the day to stay hydrated. The following changes may also help:  Drink more liquids to prevent dehydration from diarrhea or vomiting. Ask your healthcare provider how much liquid to drink each day and which liquids are best for you.    Limit or do not have caffeine. Caffeine may make symptoms such as heartburn or nausea worse.    Limit or do not drink alcohol. Alcohol can make your abdominal pain worse. Ask your healthcare provider if it is okay for you to drink alcohol. Also ask how much is okay for you to drink. A drink of alcohol is 12 ounces of beer, ½ ounce of liquor, or 5 ounces of wine.    Keep a diary of your abdominal pain. A diary may help your healthcare provider learn what is causing your pain. Include when the pain happens, how long it lasts, and what the pain feels like. Write down any other symptoms you have with abdominal pain. Also write down what you eat, and any symptoms you have after you eat.    Manage stress. Stress may cause abdominal pain. Your healthcare provider may recommend relaxation techniques and deep breathing exercises to help decrease your stress. Your healthcare provider may recommend you talk to someone about your stress or anxiety, such as a counselor or a friend. Get plenty of sleep. Exercise regularly.  Black Family Walking for Exercise      Do not smoke. Nicotine and other chemicals in cigarettes can damage your esophagus and stomach. Ask your healthcare provider for information if you currently smoke and need help to quit. E-cigarettes or smokeless tobacco still contain nicotine. Talk to your healthcare provider before you use these products.  Call your local emergency number (911 in the [place]) if:    You have chest pain or shortness of breath.    When should I seek immediate care?    You have pulsing pain in your upper abdomen or lower back that suddenly becomes constant.    Your pain is in the right lower abdominal area and worsens with movement.    You have a fever over 100.4°F (38°C) or shaking chills.    You are vomiting and cannot keep food or liquids down.    Your pain does not improve or gets worse over the next 8 to 12 hours.    You see blood in your vomit or bowel movements, or they look black and tarry.    Your skin or the whites of your eyes turn yellow.    You are a woman and have a large amount of vaginal bleeding that is not your monthly period.  When should I call my doctor?    You have pain in your lower back.    You are a man and have pain in your testicles.    You have pain when you urinate.    You have questions or concerns about your condition or care.  CARE AGREEMENT:    You have the right to help plan your care. Learn about your health condition and how it may be treated. Discuss treatment options with your healthcare providers to decide what care you want to receive. You always have the right to refuse treatment.

## 2024-05-03 NOTE — ED ADULT TRIAGE NOTE - CHIEF COMPLAINT QUOTE
Patient complaining of upper abdominal pain worsening over the past 10 days with nausea constipation, no urinary symptoms, no past surgical history

## 2024-05-06 ENCOUNTER — NON-APPOINTMENT (OUTPATIENT)
Age: 54
End: 2024-05-06

## 2024-05-14 ENCOUNTER — APPOINTMENT (OUTPATIENT)
Dept: GASTROENTEROLOGY | Facility: CLINIC | Age: 54
End: 2024-05-14
Payer: MEDICAID

## 2024-05-14 VITALS
HEART RATE: 80 BPM | DIASTOLIC BLOOD PRESSURE: 74 MMHG | BODY MASS INDEX: 31.5 KG/M2 | WEIGHT: 225 LBS | SYSTOLIC BLOOD PRESSURE: 124 MMHG | HEIGHT: 71 IN | OXYGEN SATURATION: 97 %

## 2024-05-14 DIAGNOSIS — K59.00 CONSTIPATION, UNSPECIFIED: ICD-10-CM

## 2024-05-14 PROCEDURE — 99213 OFFICE O/P EST LOW 20 MIN: CPT

## 2024-05-14 RX ORDER — DOCUSATE SODIUM 100 MG/1
100 CAPSULE ORAL
Qty: 90 | Refills: 11 | Status: ACTIVE | COMMUNITY
Start: 2024-05-14 | End: 1900-01-01

## 2024-05-14 NOTE — PHYSICAL EXAM
[None] : no edema [Normal] : normal bowel sounds, non-tender, no masses, soft, no no hepato-splenomegaly [de-identified] : Deferred

## 2024-05-14 NOTE — HISTORY OF PRESENT ILLNESS
[de-identified] : Upper endoscopy performed December 5, 2022 revealed a hiatal hernia and the benign appearing fundic gland polyps, reactive gastropathy in the antrum of the stomach and mild chronic inflammation in the gastroesophageal junction without evidence of Alex's esophagus [FreeTextEntry1] : Colonoscopy performed on December 5, 2022 revealed hyperplastic polyps and internal hemorrhoids

## 2024-05-14 NOTE — REVIEW OF SYSTEMS
[Abdominal Pain] : abdominal pain [Vomiting] : no vomiting [Constipation] : constipation [Diarrhea] : no diarrhea [Heartburn] : no heartburn [Melena (black stool)] : no melena [Bleeding] : no bleeding [Fecal Incontinence (soiling)] : no fecal incontinence [Bloating (gassiness)] : bloating

## 2024-05-14 NOTE — ASSESSMENT
[FreeTextEntry1] : Patient is a 53-year-old male with complaints of abdominal pain, abdominal bloating and constipation.  He has recently been seen in [facility] emergency room where an abdominal pelvic CAT scan was performed which did not reveal any acute pathology.  A colonoscopy performed in December 2022 was also negative for any significant pathology.  I have recommended to the patient that he adhere to a high-fiber diet, take Benefiber daily, and to use MiraLAX 1-3 times a day as necessary.  I have also prescribed Colace 300 mg at bedtime.  We may wish to consider medication such as Amitiza, Trulance, or Linzess

## 2024-06-01 ENCOUNTER — RX RENEWAL (OUTPATIENT)
Age: 54
End: 2024-06-01

## 2024-06-01 RX ORDER — OMEPRAZOLE 20 MG/1
20 CAPSULE, DELAYED RELEASE ORAL
Qty: 30 | Refills: 5 | Status: ACTIVE | COMMUNITY
Start: 2019-08-12 | End: 1900-01-01

## 2024-06-17 ENCOUNTER — APPOINTMENT (OUTPATIENT)
Dept: INTERNAL MEDICINE | Facility: CLINIC | Age: 54
End: 2024-06-17
Payer: MEDICAID

## 2024-06-17 VITALS
WEIGHT: 222 LBS | OXYGEN SATURATION: 97 % | HEART RATE: 84 BPM | TEMPERATURE: 97.9 F | DIASTOLIC BLOOD PRESSURE: 79 MMHG | BODY MASS INDEX: 31.08 KG/M2 | HEIGHT: 71 IN | SYSTOLIC BLOOD PRESSURE: 127 MMHG

## 2024-06-17 DIAGNOSIS — E11.9 TYPE 2 DIABETES MELLITUS W/OUT COMPLICATIONS: ICD-10-CM

## 2024-06-17 DIAGNOSIS — Z00.00 ENCOUNTER FOR GENERAL ADULT MEDICAL EXAMINATION W/OUT ABNORMAL FINDINGS: ICD-10-CM

## 2024-06-17 DIAGNOSIS — R53.83 OTHER FATIGUE: ICD-10-CM

## 2024-06-17 PROCEDURE — 99214 OFFICE O/P EST MOD 30 MIN: CPT

## 2024-06-17 RX ORDER — ORAL SEMAGLUTIDE 7 MG/1
7 TABLET ORAL
Qty: 90 | Refills: 1 | Status: ACTIVE | COMMUNITY
Start: 2023-08-18 | End: 1900-01-01

## 2024-06-17 NOTE — ASSESSMENT
[FreeTextEntry1] : DM. A1C above goal will incr may discussed die/texercise. cont statin. utd eye , foot exam. utd microalb.  abd discomfort improved w constipation meds. rec food log. rec core workout. rec exercise. discussed pt he declined. can cont massage gave sleep study for 3rd time; he iwll schedule did stress test, echo, nothing acute. no exertional sx. due for lipids fasting and cardio fu htn at goal

## 2024-06-17 NOTE — HISTORY OF PRESENT ILLNESS
[de-identified] : DM does not check. once was 89. tolerating meds. went to [facility] ate, did not exercise.  utd ophtho feet feel fine taking statin. abd discomfort improved w constipation meds. 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 did some masssage and felt better.  notes easily waking up at night, feels tired in the mornign. snores. did stress test, echo, nothing acute. no exertional sx. due for lipids fasting and cardio fu htn taking meds as directed.

## 2024-06-26 ENCOUNTER — NON-APPOINTMENT (OUTPATIENT)
Age: 54
End: 2024-06-26

## 2024-06-27 ENCOUNTER — RX RENEWAL (OUTPATIENT)
Age: 54
End: 2024-06-27

## 2024-06-27 LAB
ALBUMIN SERPL ELPH-MCNC: 4.2 G/DL
CHOLEST SERPL-MCNC: 117 MG/DL
HDLC SERPL-MCNC: 34 MG/DL
LDLC SERPL CALC-MCNC: 59 MG/DL
NONHDLC SERPL-MCNC: 83 MG/DL
PSA SERPL-MCNC: 0.64 NG/ML
TRIGL SERPL-MCNC: 138 MG/DL

## 2024-07-10 ENCOUNTER — NON-APPOINTMENT (OUTPATIENT)
Age: 54
End: 2024-07-10

## 2024-07-10 ENCOUNTER — APPOINTMENT (OUTPATIENT)
Dept: SLEEP CENTER | Facility: CLINIC | Age: 54
End: 2024-07-10
Payer: MEDICAID

## 2024-07-10 ENCOUNTER — OUTPATIENT (OUTPATIENT)
Dept: OUTPATIENT SERVICES | Facility: HOSPITAL | Age: 54
LOS: 1 days | End: 2024-07-10
Payer: MEDICAID

## 2024-07-10 DIAGNOSIS — Z98.89 UNDEFINED: Chronic | ICD-10-CM

## 2024-07-10 PROCEDURE — 95800 SLP STDY UNATTENDED: CPT | Mod: 26

## 2024-07-10 PROCEDURE — 95800 SLP STDY UNATTENDED: CPT

## 2024-07-11 ENCOUNTER — RX RENEWAL (OUTPATIENT)
Age: 54
End: 2024-07-11

## 2024-07-11 RX ORDER — ASPIRIN 81 MG/1
81 TABLET, COATED ORAL
Qty: 90 | Refills: 1 | Status: ACTIVE | COMMUNITY
Start: 2024-07-11 | End: 1900-01-01

## 2024-07-18 DIAGNOSIS — G47.33 OBSTRUCTIVE SLEEP APNEA (ADULT) (PEDIATRIC): ICD-10-CM

## 2024-11-04 ENCOUNTER — APPOINTMENT (OUTPATIENT)
Dept: INTERNAL MEDICINE | Facility: CLINIC | Age: 54
End: 2024-11-04
Payer: MEDICAID

## 2024-11-04 ENCOUNTER — NON-APPOINTMENT (OUTPATIENT)
Age: 54
End: 2024-11-04

## 2024-11-04 VITALS
DIASTOLIC BLOOD PRESSURE: 79 MMHG | OXYGEN SATURATION: 98 % | SYSTOLIC BLOOD PRESSURE: 128 MMHG | HEART RATE: 81 BPM | HEIGHT: 71 IN | WEIGHT: 222 LBS | TEMPERATURE: 98.8 F | BODY MASS INDEX: 31.08 KG/M2

## 2024-11-04 DIAGNOSIS — Z00.00 ENCOUNTER FOR GENERAL ADULT MEDICAL EXAMINATION W/OUT ABNORMAL FINDINGS: ICD-10-CM

## 2024-11-04 DIAGNOSIS — E11.9 TYPE 2 DIABETES MELLITUS W/OUT COMPLICATIONS: ICD-10-CM

## 2024-11-04 DIAGNOSIS — K21.9 GASTRO-ESOPHAGEAL REFLUX DISEASE W/OUT ESOPHAGITIS: ICD-10-CM

## 2024-11-04 DIAGNOSIS — R55 SYNCOPE AND COLLAPSE: ICD-10-CM

## 2024-11-04 DIAGNOSIS — I10 ESSENTIAL (PRIMARY) HYPERTENSION: ICD-10-CM

## 2024-11-04 PROCEDURE — 93000 ELECTROCARDIOGRAM COMPLETE: CPT

## 2024-11-04 PROCEDURE — G0008: CPT

## 2024-11-04 PROCEDURE — 99396 PREV VISIT EST AGE 40-64: CPT | Mod: 25

## 2024-11-04 PROCEDURE — 90656 IIV3 VACC NO PRSV 0.5 ML IM: CPT

## 2024-11-04 PROCEDURE — 99214 OFFICE O/P EST MOD 30 MIN: CPT | Mod: 25

## 2024-11-05 LAB
25(OH)D3 SERPL-MCNC: 31.5 NG/ML
CHOLEST SERPL-MCNC: 108 MG/DL
ESTIMATED AVERAGE GLUCOSE: 177 MG/DL
HBA1C MFR BLD HPLC: 7.8 %
HCT VFR BLD CALC: 42.7 %
HDLC SERPL-MCNC: 32 MG/DL
HGB BLD-MCNC: 13.3 G/DL
LDLC SERPL CALC-MCNC: 49 MG/DL
MCHC RBC-ENTMCNC: 28.5 PG
MCHC RBC-ENTMCNC: 31.1 G/DL
MCV RBC AUTO: 91.4 FL
NONHDLC SERPL-MCNC: 75 MG/DL
PLATELET # BLD AUTO: 236 K/UL
RBC # BLD: 4.67 M/UL
RBC # FLD: 13.5 %
TRIGL SERPL-MCNC: 148 MG/DL
TSH SERPL-ACNC: 2.66 UIU/ML
VIT B12 SERPL-MCNC: 792 PG/ML
WBC # FLD AUTO: 8.16 K/UL

## 2024-11-12 ENCOUNTER — APPOINTMENT (OUTPATIENT)
Dept: GASTROENTEROLOGY | Facility: CLINIC | Age: 54
End: 2024-11-12

## 2024-11-12 DIAGNOSIS — G47.33 OBSTRUCTIVE SLEEP APNEA (ADULT) (PEDIATRIC): ICD-10-CM

## 2024-11-29 ENCOUNTER — RX RENEWAL (OUTPATIENT)
Age: 54
End: 2024-11-29

## 2024-12-17 ENCOUNTER — APPOINTMENT (OUTPATIENT)
Dept: GASTROENTEROLOGY | Facility: CLINIC | Age: 54
End: 2024-12-17
Payer: COMMERCIAL

## 2024-12-17 VITALS
DIASTOLIC BLOOD PRESSURE: 70 MMHG | SYSTOLIC BLOOD PRESSURE: 126 MMHG | WEIGHT: 220 LBS | HEIGHT: 71 IN | BODY MASS INDEX: 30.8 KG/M2 | OXYGEN SATURATION: 98 % | HEART RATE: 80 BPM | TEMPERATURE: 98.4 F

## 2024-12-17 DIAGNOSIS — K21.9 GASTRO-ESOPHAGEAL REFLUX DISEASE W/OUT ESOPHAGITIS: ICD-10-CM

## 2024-12-17 DIAGNOSIS — R10.9 UNSPECIFIED ABDOMINAL PAIN: ICD-10-CM

## 2024-12-17 PROCEDURE — 99214 OFFICE O/P EST MOD 30 MIN: CPT

## 2024-12-17 RX ORDER — CHROMIUM 200 MCG
TABLET ORAL
Refills: 0 | Status: ACTIVE | COMMUNITY

## 2025-01-14 ENCOUNTER — APPOINTMENT (OUTPATIENT)
Dept: GASTROENTEROLOGY | Facility: CLINIC | Age: 55
End: 2025-01-14

## 2025-01-21 ENCOUNTER — APPOINTMENT (OUTPATIENT)
Dept: GASTROENTEROLOGY | Facility: CLINIC | Age: 55
End: 2025-01-21

## 2025-01-22 ENCOUNTER — RX RENEWAL (OUTPATIENT)
Age: 55
End: 2025-01-22

## 2025-01-24 ENCOUNTER — RX RENEWAL (OUTPATIENT)
Age: 55
End: 2025-01-24

## 2025-03-17 ENCOUNTER — APPOINTMENT (OUTPATIENT)
Dept: INTERNAL MEDICINE | Facility: CLINIC | Age: 55
End: 2025-03-17
Payer: COMMERCIAL

## 2025-03-17 VITALS
DIASTOLIC BLOOD PRESSURE: 81 MMHG | HEIGHT: 71 IN | SYSTOLIC BLOOD PRESSURE: 132 MMHG | BODY MASS INDEX: 31.78 KG/M2 | TEMPERATURE: 98.2 F | WEIGHT: 227 LBS | OXYGEN SATURATION: 98 % | HEART RATE: 73 BPM

## 2025-03-17 DIAGNOSIS — E11.9 TYPE 2 DIABETES MELLITUS W/OUT COMPLICATIONS: ICD-10-CM

## 2025-03-17 DIAGNOSIS — G47.33 OBSTRUCTIVE SLEEP APNEA (ADULT) (PEDIATRIC): ICD-10-CM

## 2025-03-17 DIAGNOSIS — I10 ESSENTIAL (PRIMARY) HYPERTENSION: ICD-10-CM

## 2025-03-17 DIAGNOSIS — M54.9 DORSALGIA, UNSPECIFIED: ICD-10-CM

## 2025-03-17 PROCEDURE — 99214 OFFICE O/P EST MOD 30 MIN: CPT | Mod: 25

## 2025-03-17 PROCEDURE — 83036 HEMOGLOBIN GLYCOSYLATED A1C: CPT | Mod: QW

## 2025-03-18 LAB
ALBUMIN SERPL ELPH-MCNC: 4.4 G/DL
ALP BLD-CCNC: 110 U/L
ALT SERPL-CCNC: 59 U/L
ANION GAP SERPL CALC-SCNC: 12 MMOL/L
APPEARANCE: CLEAR
AST SERPL-CCNC: 39 U/L
BILIRUB SERPL-MCNC: 0.3 MG/DL
BILIRUBIN URINE: NEGATIVE
BLOOD URINE: NEGATIVE
BUN SERPL-MCNC: 6 MG/DL
CALCIUM SERPL-MCNC: 9.8 MG/DL
CHLORIDE SERPL-SCNC: 101 MMOL/L
CHOLEST SERPL-MCNC: 141 MG/DL
CO2 SERPL-SCNC: 25 MMOL/L
COLOR: YELLOW
CREAT SERPL-MCNC: 0.94 MG/DL
EGFRCR SERPLBLD CKD-EPI 2021: 95 ML/MIN/1.73M2
GLUCOSE QUALITATIVE U: 250 MG/DL
GLUCOSE SERPL-MCNC: 191 MG/DL
HDLC SERPL-MCNC: 30 MG/DL
KETONES URINE: NEGATIVE MG/DL
LDLC SERPL-MCNC: 72 MG/DL
LEUKOCYTE ESTERASE URINE: NEGATIVE
NITRITE URINE: NEGATIVE
NONHDLC SERPL-MCNC: 111 MG/DL
PH URINE: 6.5
POTASSIUM SERPL-SCNC: 4.6 MMOL/L
PROT SERPL-MCNC: 7.1 G/DL
PROTEIN URINE: NEGATIVE MG/DL
SODIUM SERPL-SCNC: 138 MMOL/L
SPECIFIC GRAVITY URINE: 1.01
TRIGL SERPL-MCNC: 239 MG/DL
UROBILINOGEN URINE: 0.2 MG/DL

## 2025-04-14 ENCOUNTER — APPOINTMENT (OUTPATIENT)
Dept: ULTRASOUND IMAGING | Facility: CLINIC | Age: 55
End: 2025-04-14
Payer: COMMERCIAL

## 2025-04-14 ENCOUNTER — APPOINTMENT (OUTPATIENT)
Dept: GASTROENTEROLOGY | Facility: CLINIC | Age: 55
End: 2025-04-14

## 2025-04-14 PROCEDURE — 76775 US EXAM ABDO BACK WALL LIM: CPT

## 2025-04-17 ENCOUNTER — NON-APPOINTMENT (OUTPATIENT)
Age: 55
End: 2025-04-17

## 2025-06-16 ENCOUNTER — APPOINTMENT (OUTPATIENT)
Dept: INTERNAL MEDICINE | Facility: CLINIC | Age: 55
End: 2025-06-16
Payer: COMMERCIAL

## 2025-06-16 VITALS
BODY MASS INDEX: 31.22 KG/M2 | SYSTOLIC BLOOD PRESSURE: 135 MMHG | HEIGHT: 71 IN | WEIGHT: 223 LBS | OXYGEN SATURATION: 98 % | HEART RATE: 73 BPM | DIASTOLIC BLOOD PRESSURE: 82 MMHG | TEMPERATURE: 98.3 F

## 2025-06-16 LAB — HBA1C MFR BLD HPLC: 8.4

## 2025-06-16 PROCEDURE — 83036 HEMOGLOBIN GLYCOSYLATED A1C: CPT | Mod: QW

## 2025-06-16 PROCEDURE — 99214 OFFICE O/P EST MOD 30 MIN: CPT | Mod: 25

## 2025-06-30 ENCOUNTER — APPOINTMENT (OUTPATIENT)
Dept: CARDIOLOGY | Facility: CLINIC | Age: 55
End: 2025-06-30
Payer: COMMERCIAL

## 2025-06-30 ENCOUNTER — NON-APPOINTMENT (OUTPATIENT)
Age: 55
End: 2025-06-30

## 2025-06-30 VITALS — WEIGHT: 228 LBS | BODY MASS INDEX: 31.8 KG/M2

## 2025-06-30 PROCEDURE — 99401 PREV MED CNSL INDIV APPRX 15: CPT

## 2025-06-30 PROCEDURE — G0537: CPT

## 2025-06-30 PROCEDURE — 93000 ELECTROCARDIOGRAM COMPLETE: CPT

## 2025-06-30 PROCEDURE — 99214 OFFICE O/P EST MOD 30 MIN: CPT | Mod: 25

## 2025-07-14 ENCOUNTER — APPOINTMENT (OUTPATIENT)
Dept: CARDIOLOGY | Facility: CLINIC | Age: 55
End: 2025-07-14

## (undated) DEVICE — ENDOCUFF VISION SZ 3 SM PRPL

## (undated) DEVICE — SYR IV POSIFLUSH NS 3ML 30/TY

## (undated) DEVICE — TUBE O2 SUPL CRUSH RESIS CONN SOUTHSIDE ONLY

## (undated) DEVICE — PACK IV START WITH CHG

## (undated) DEVICE — SYR LUER SLIP TIP 30CC

## (undated) DEVICE — CANISTER SUCTION 1200CC 10/SL

## (undated) DEVICE — SET IV PUMP BLOOD 1VALVE 180FILTER NON-DEHP

## (undated) DEVICE — SOL IRR POUR H2O 500ML

## (undated) DEVICE — ENDOCUFF VISION SZ 2 LG GRN

## (undated) DEVICE — ELCTR GROUNDING PAD ADULT COVIDIEN

## (undated) DEVICE — TUBING ENDO EXT OLYMPUS 160 24HR USE

## (undated) DEVICE — MASK OXYGEN PANORAMIC

## (undated) DEVICE — TUBE RECTAL 24FR

## (undated) DEVICE — SUCTION YANKAUER TAPERED BULBOUS NO VENT

## (undated) DEVICE — SOL IRR NS 0.9% 250ML

## (undated) DEVICE — CATH IV SAFE BC 20G X 1.16" (PINK)

## (undated) DEVICE — TRAP SPECIMEN SPUTUM 40CC

## (undated) DEVICE — MASK O2 NON REBREATH 3IN1 ADULT

## (undated) DEVICE — SUT HEWSON RETRIEVER

## (undated) DEVICE — NDL INJ SCLERO INTERJECT 23G

## (undated) DEVICE — CATH IV SAFE BC 22G X 1" (BLUE)

## (undated) DEVICE — SYR LUER LOK 30CC

## (undated) DEVICE — TUBING IV SET GRAVITY 3Y 100" MACRO

## (undated) DEVICE — SYR LUER SLIP TIP 50CC

## (undated) DEVICE — CATH ELCTR GLIDE PRB 7FR

## (undated) DEVICE — SNARE LRG

## (undated) DEVICE — Device

## (undated) DEVICE — TUBING IV SET SECONDARY 34"

## (undated) DEVICE — FORCEP RADIAL JAW 4 W NDL 2.2MM 2.8MM 160CM YELLOW DISP

## (undated) DEVICE — STERIS DEFENDO 3-PIECE KIT (AIR/WATER, SUCTION & BIOPSY VALVES)

## (undated) DEVICE — MARKER ENDO SPOT EX

## (undated) DEVICE — BRUSH CYTO ENDO

## (undated) DEVICE — SNARE POLYP SENS 27MM 240CM

## (undated) DEVICE — CLAMP BX HOT RAD JAW 3

## (undated) DEVICE — VALVE BIOPSY

## (undated) DEVICE — FORMALIN CUPS 10% BUFFERED

## (undated) DEVICE — POLY TRAP ETRAP

## (undated) DEVICE — BITE BLOCK MAXI RUBBER STAMP

## (undated) DEVICE — TUBING SUCTION CONN 6FT STERILE

## (undated) DEVICE — SENSOR O2 FINGER XL ADULT 24/BX 6BX/CA

## (undated) DEVICE — RETRIEVER ROTH NET PLATINUM-UNIVERSAL

## (undated) DEVICE — DRSG CURITY GAUZE SPONGE 4 X 4" 12-PLY

## (undated) DEVICE — FORCEP RADIAL JAW 4 JUMBO 2.8MM 3.2MM 240CM ORANGE DISP

## (undated) DEVICE — BRUSH COLONOSCOPY CYTOLOGY

## (undated) DEVICE — SYR ALLIANCE II INFLATION 60ML

## (undated) DEVICE — FORCEP RADIAL JAW 4 W NDL 2.4MM 2.8MM 240CM ORANGE DISP

## (undated) DEVICE — CATH ELECHMSTAT  INJ 7FR 210CM

## (undated) DEVICE — TUBING IV SET SECOND 34" W/O LOK-BLUNT

## (undated) DEVICE — TRAP QUICK CATCH  SINGL CHAMBER

## (undated) DEVICE — TUBING CANNULA SALTER LABS NASAL ADULT 7FT

## (undated) DEVICE — RADIAL JAW 4 LG CAPACITY WITH NDL